# Patient Record
Sex: MALE | Race: WHITE | NOT HISPANIC OR LATINO | Employment: OTHER | URBAN - METROPOLITAN AREA
[De-identification: names, ages, dates, MRNs, and addresses within clinical notes are randomized per-mention and may not be internally consistent; named-entity substitution may affect disease eponyms.]

---

## 2020-12-07 ENCOUNTER — TRANSCRIBE ORDERS (OUTPATIENT)
Dept: PREADMISSION TESTING | Facility: HOSPITAL | Age: 70
End: 2020-12-07

## 2020-12-07 DIAGNOSIS — Z01.818 OTHER SPECIFIED PRE-OPERATIVE EXAMINATION: Primary | ICD-10-CM

## 2020-12-08 ENCOUNTER — APPOINTMENT (OUTPATIENT)
Dept: PREADMISSION TESTING | Facility: HOSPITAL | Age: 70
End: 2020-12-08

## 2020-12-08 ENCOUNTER — HOSPITAL ENCOUNTER (OUTPATIENT)
Dept: GENERAL RADIOLOGY | Facility: HOSPITAL | Age: 70
Discharge: HOME | End: 2020-12-08

## 2020-12-08 VITALS
WEIGHT: 224 LBS | RESPIRATION RATE: 20 BRPM | HEART RATE: 87 BPM | HEIGHT: 72 IN | SYSTOLIC BLOOD PRESSURE: 153 MMHG | TEMPERATURE: 99.1 F | OXYGEN SATURATION: 96 % | DIASTOLIC BLOOD PRESSURE: 74 MMHG | BODY MASS INDEX: 30.34 KG/M2

## 2020-12-08 LAB
ALBUMIN SERPL-MCNC: 3.7 G/DL (ref 3.5–5.2)
ALBUMIN/GLOB SERPL: 1 G/DL
ALP SERPL-CCNC: 72 U/L (ref 39–117)
ALT SERPL W P-5'-P-CCNC: 25 U/L (ref 1–41)
ANION GAP SERPL CALCULATED.3IONS-SCNC: 8 MMOL/L (ref 5–15)
APTT PPP: 32.8 SECONDS (ref 22.7–35.4)
AST SERPL-CCNC: 18 U/L (ref 1–40)
BASOPHILS # BLD AUTO: 0.03 10*3/MM3 (ref 0–0.2)
BASOPHILS NFR BLD AUTO: 0.3 % (ref 0–1.5)
BILIRUB SERPL-MCNC: 0.5 MG/DL (ref 0–1.2)
BILIRUB UR QL STRIP: NEGATIVE
BUN SERPL-MCNC: 16 MG/DL (ref 8–23)
BUN/CREAT SERPL: 28.1 (ref 7–25)
CALCIUM SPEC-SCNC: 10 MG/DL (ref 8.6–10.5)
CHLORIDE SERPL-SCNC: 101 MMOL/L (ref 98–107)
CLARITY UR: CLEAR
CO2 SERPL-SCNC: 25 MMOL/L (ref 22–29)
COLOR UR: YELLOW
CREAT SERPL-MCNC: 0.57 MG/DL (ref 0.76–1.27)
DEPRECATED RDW RBC AUTO: 37.9 FL (ref 37–54)
EOSINOPHIL # BLD AUTO: 0.13 10*3/MM3 (ref 0–0.4)
EOSINOPHIL NFR BLD AUTO: 1.4 % (ref 0.3–6.2)
ERYTHROCYTE [DISTWIDTH] IN BLOOD BY AUTOMATED COUNT: 11.3 % (ref 12.3–15.4)
GFR SERPL CREATININE-BSD FRML MDRD: 141 ML/MIN/1.73
GLOBULIN UR ELPH-MCNC: 3.6 GM/DL
GLUCOSE SERPL-MCNC: 168 MG/DL (ref 65–99)
GLUCOSE UR STRIP-MCNC: NEGATIVE MG/DL
HCT VFR BLD AUTO: 34.2 % (ref 37.5–51)
HGB BLD-MCNC: 11.8 G/DL (ref 13–17.7)
HGB UR QL STRIP.AUTO: NEGATIVE
IMM GRANULOCYTES # BLD AUTO: 0.06 10*3/MM3 (ref 0–0.05)
IMM GRANULOCYTES NFR BLD AUTO: 0.6 % (ref 0–0.5)
INR PPP: 1.25 (ref 0.9–1.1)
KETONES UR QL STRIP: ABNORMAL
LEUKOCYTE ESTERASE UR QL STRIP.AUTO: NEGATIVE
LYMPHOCYTES # BLD AUTO: 1.29 10*3/MM3 (ref 0.7–3.1)
LYMPHOCYTES NFR BLD AUTO: 13.5 % (ref 19.6–45.3)
MCH RBC QN AUTO: 31.8 PG (ref 26.6–33)
MCHC RBC AUTO-ENTMCNC: 34.5 G/DL (ref 31.5–35.7)
MCV RBC AUTO: 92.2 FL (ref 79–97)
MONOCYTES # BLD AUTO: 0.86 10*3/MM3 (ref 0.1–0.9)
MONOCYTES NFR BLD AUTO: 9 % (ref 5–12)
NEUTROPHILS NFR BLD AUTO: 7.2 10*3/MM3 (ref 1.7–7)
NEUTROPHILS NFR BLD AUTO: 75.2 % (ref 42.7–76)
NITRITE UR QL STRIP: NEGATIVE
NRBC BLD AUTO-RTO: 0 /100 WBC (ref 0–0.2)
PH UR STRIP.AUTO: <=5 [PH] (ref 5–8)
PLATELET # BLD AUTO: 323 10*3/MM3 (ref 140–450)
PMV BLD AUTO: 9.8 FL (ref 6–12)
POTASSIUM SERPL-SCNC: 3.9 MMOL/L (ref 3.5–5.2)
PROT SERPL-MCNC: 7.3 G/DL (ref 6–8.5)
PROT UR QL STRIP: NEGATIVE
PROTHROMBIN TIME: 15.5 SECONDS (ref 11.7–14.2)
RBC # BLD AUTO: 3.71 10*6/MM3 (ref 4.14–5.8)
SODIUM SERPL-SCNC: 134 MMOL/L (ref 136–145)
SP GR UR STRIP: 1.03 (ref 1–1.03)
UROBILINOGEN UR QL STRIP: ABNORMAL
WBC # BLD AUTO: 9.57 10*3/MM3 (ref 3.4–10.8)

## 2020-12-08 PROCEDURE — 63710000001 MUPIROCIN 2 % OINTMENT: Performed by: ORTHOPAEDIC SURGERY

## 2020-12-08 PROCEDURE — A9270 NON-COVERED ITEM OR SERVICE: HCPCS | Performed by: ORTHOPAEDIC SURGERY

## 2020-12-08 PROCEDURE — 81003 URINALYSIS AUTO W/O SCOPE: CPT

## 2020-12-08 PROCEDURE — 73560 X-RAY EXAM OF KNEE 1 OR 2: CPT

## 2020-12-08 PROCEDURE — 85025 COMPLETE CBC W/AUTO DIFF WBC: CPT

## 2020-12-08 PROCEDURE — 85610 PROTHROMBIN TIME: CPT

## 2020-12-08 PROCEDURE — 71046 X-RAY EXAM CHEST 2 VIEWS: CPT

## 2020-12-08 PROCEDURE — 80053 COMPREHEN METABOLIC PANEL: CPT

## 2020-12-08 PROCEDURE — 36415 COLL VENOUS BLD VENIPUNCTURE: CPT

## 2020-12-08 PROCEDURE — 87086 URINE CULTURE/COLONY COUNT: CPT

## 2020-12-08 PROCEDURE — 85730 THROMBOPLASTIN TIME PARTIAL: CPT

## 2020-12-08 RX ORDER — ASPIRIN 81 MG/1
81 TABLET ORAL DAILY
COMMUNITY
End: 2020-12-18 | Stop reason: HOSPADM

## 2020-12-08 RX ORDER — MELOXICAM 7.5 MG/1
7.5 TABLET ORAL DAILY
COMMUNITY
End: 2020-12-24 | Stop reason: HOSPADM

## 2020-12-08 RX ORDER — RAMIPRIL 10 MG/1
10 CAPSULE ORAL DAILY
COMMUNITY

## 2020-12-08 RX ORDER — TERAZOSIN 5 MG/1
5 CAPSULE ORAL NIGHTLY
COMMUNITY

## 2020-12-08 RX ORDER — CHLORHEXIDINE GLUCONATE 500 MG/1
1 CLOTH TOPICAL TAKE AS DIRECTED
Status: ON HOLD | COMMUNITY
End: 2020-12-14

## 2020-12-08 RX ORDER — ATORVASTATIN CALCIUM 40 MG/1
40 TABLET, FILM COATED ORAL NIGHTLY
COMMUNITY

## 2020-12-08 RX ORDER — HYDROCHLOROTHIAZIDE 25 MG/1
25 TABLET ORAL AS NEEDED
COMMUNITY
End: 2020-12-24 | Stop reason: HOSPADM

## 2020-12-08 RX ORDER — GLIMEPIRIDE 2 MG/1
2 TABLET ORAL
COMMUNITY

## 2020-12-08 RX ORDER — ACETAMINOPHEN 500 MG
1000 TABLET ORAL EVERY 6 HOURS PRN
COMMUNITY

## 2020-12-08 RX ORDER — METOPROLOL TARTRATE 100 MG/1
100 TABLET ORAL 2 TIMES DAILY
COMMUNITY

## 2020-12-08 ASSESSMENT — KOOS JR
KOOS JR SCORE: 22
KOOS JR SCORE: 31.307

## 2020-12-08 NOTE — DISCHARGE INSTRUCTIONS
Take the following medications the morning of surgery:    METOPROLOL    If you are on prescription narcotic pain medication to control your pain you may also take that medication the morning of surgery.    General Instructions:  • Do not eat solid food after midnight the night before surgery.  • You may drink clear liquids day of surgery but must stop at least one hour before your hospital arrival time.  • It is beneficial for you to have a clear drink that contains carbohydrates the day of surgery.  We suggest  a 12 to 20 ounce bottle of G2 or Powerade Zero for diabetic patients.     Clear liquids are liquids you can see through.  Nothing red in color.     Plain water                               Sports drinks  Sodas                                   Gelatin (Jell-O)  Fruit juices without pulp such as white grape juice and apple juice  Popsicles that contain no fruit or yogurt  Tea or coffee (no cream or milk added)  Gatorade / Powerade  G2 / Powerade Zero      • Patients who avoid smoking, chewing tobacco and alcohol for 4 weeks prior to surgery have a reduced risk of post-operative complications.     • Do not smoke, use chewing tobacco or drink alcohol the day of surgery.   • Bring any papers given to you in the doctor’s office.  • Wear clean comfortable clothes.  • Do not wear contact lenses, false eyelashes or make-up.  Bring a case for your glasses.   • Remove all piercings.  Leave jewelry and any other valuables at home.  • The Pre-Admission Testing nurse will instruct you to bring medications if unable to obtain an accurate list in Pre-Admission Testing.    • REPORT TO MAIN SURGERY ON 12- AT 0600 AM          Preventing a Surgical Site Infection:  • For 2 to 3 days before surgery, avoid shaving with a razor because the razor can irritate skin and make it easier to develop an infection.    • Any areas of open skin can increase the risk of a post-operative wound infection by allowing bacteria to enter  and travel throughout the body.  Notify your surgeon if you have any skin wounds / rashes even if it is not near the expected surgical site.  The area will need assessed to determine if surgery should be delayed until it is healed.  • The night prior to surgery shower using a fresh bar of anti-bacterial soap (such as Dial) and clean washcloth.  Sleep in a clean bed with clean clothing.  Do not allow pets to sleep with you.  • Shower on the morning of surgery using a fresh bar of anti-bacterial soap (such as Dial) and clean washcloth.  Dry with a clean towel and dress in clean clothing.  • Ask your surgeon if you will be receiving antibiotics prior to surgery.  • Make sure you, your family, and all healthcare providers clean their hands with soap and water or an alcohol based hand  before caring for you or your wound.    Day of surgery:  Your arrival time is approximately two hours before your scheduled surgery time.  Upon arrival, a Pre-op nurse and Anesthesiologist will review your health history, obtain vital signs, and answer questions you may have.  The only belongings needed at this time will be a list of your home medications and if applicable your C-PAP/BI-PAP machine.  A Pre-op nurse will start an IV and you may receive medication in preparation for surgery, including something to help you relax.     Please be aware that surgery does come with discomfort.  We want to make every effort to control your discomfort so please discuss any uncontrolled symptoms with your nurse.   Your doctor will most likely have prescribed pain medications.      CHLORHEXIDINE CLOTH INSTRUCTIONS  The morning of surgery follow these instructions using the Chlorhexidine cloths you've been given.  These steps reduce bacteria on the body.  Do not use the cloths near your eyes, ears mouth, genitalia or on open wounds.  Throw the cloths away after use but do not try to flush them down a toilet.      • Open and remove one cloth  at a time from the package.    • Leave the cloth unfolded and begin the bathing.  • Massage the skin with the cloths using gentle pressure to remove bacteria.  Do not scrub harshly.   • Follow the steps below with one 2% CHG cloth per area (6 total cloths).  • One cloth for neck, shoulders and chest.  • One cloth for both arms, hands, fingers and underarms (do underarms last).  • One cloth for the abdomen followed by groin.  • One cloth for right leg and foot including between the toes.  • One cloth for left leg and foot including between the toes.  • The last cloth is to be used for the back of the neck, back and buttocks.    Allow the CHG to air dry 3 minutes on the skin which will give it time to work and decrease the chance of irritation.  The skin may feel sticky until it is dry.  Do not rinse with water or any other liquid or you will lose the beneficial effects of the CHG.  If mild skin irritation occurs, do rinse the skin to remove the CHG.  Report this to the nurse at time of admission.  Do not apply lotions, creams, ointments, deodorants or perfumes after using the clothes. Dress in clean clothes before coming to the hospital.    BACTROBAN NASAL OINTMENT   X 3  There are many germs normally in your nose. Bactroban is an ointment that will help reduce these germs. Please follow these instructions for Bactroban use:      __1__The day before surgery in the morning  [id number]_______    _2___The day before surgery in the evening              [id number]_____    __3__The day of surgery in the morning    [id number]_______    **Squirt ½ package of Bactroban Ointment onto a cotton applicator and apply to inside of 1st nostril.  Squirt the remaining Bactroban and apply to the inside of the other nostril.      If you are staying overnight following surgery, you will be transported to your hospital room following the recovery period.  [facility] has all private rooms.    If you have any  questions please call Pre-Admission Testing at [phone].  Deductibles and co-payments are collected on the day of service. Please be prepared to pay the required co-pay, deductible or deposit on the day of service as defined by your plan.    Patient Education for Self-Quarantine Process    Following your COVID testing, we strongly recommend that you do not leave your home after you have been tested for COVID except to get medical care. This includes not going to work, school or to public areas.  If this is not possible for you to do please limit your activities to only required outings.  Be sure to wear a mask when you are with other people, practice social distancing and wash your hands frequently.      The following items provide additional details to keep you safe.  • Wash your hands with soap and water frequently for at least 20 seconds.   • Avoid touching your eyes, nose and mouth with unwashed hands.  • Do not share anything - utensils, towels, food from the same bowl.   • Have your own utensils, drinking glass, dishes, towels and bedding.   • Do not have visitors.   • Do use FaceTime to stay in touch with family and friends.  • You should stay in a specific room away from others if possible.   • Stay at least 6 feet away from others in the home if you cannot have a dedicated room to yourself.   • Do not snuggle with your pet. While the CDC says there is no evidence that pets can spread COVID-19 or be infected from humans, it is probably best to avoid “petting, snuggling, being kissed or licked and sharing food (during self-quarantine)”, according to the CDC.   • Sanitize household surfaces daily. Include all high touch areas (door handles, light switches, phones, countertops, etc.)  • Do not share a bathroom with others, if possible.   • Wear a mask around others in your home if you are unable to stay in a separate room or 6 feet apart. If  you are unable to wear a mask, have your family member wear a  mask if they must be within 6 feet of you.   Call your surgeon immediately if you experience any of the following symptoms:  • Sore Throat  • Shortness of Breath or difficulty breathing  • Cough  • Chills  • Body soreness or muscle pain  • Headache  • Fever  • New loss of taste or smell  • Do not arrive for your surgery ill.  Your procedure will need to be rescheduled to another time.  You will need to call your physician before the day of surgery to avoid any unnecessary exposure to hospital staff as well as other patients.

## 2020-12-09 LAB — BACTERIA SPEC AEROBE CULT: NO GROWTH

## 2020-12-11 ENCOUNTER — LAB (OUTPATIENT)
Dept: LAB | Facility: HOSPITAL | Age: 70
End: 2020-12-11

## 2020-12-11 DIAGNOSIS — Z01.818 OTHER SPECIFIED PRE-OPERATIVE EXAMINATION: ICD-10-CM

## 2020-12-11 PROCEDURE — U0004 COV-19 TEST NON-CDC HGH THRU: HCPCS

## 2020-12-11 PROCEDURE — C9803 HOPD COVID-19 SPEC COLLECT: HCPCS

## 2020-12-12 LAB — SARS-COV-2 RNA RESP QL NAA+PROBE: NOT DETECTED

## 2020-12-14 ENCOUNTER — ANESTHESIA EVENT (OUTPATIENT)
Dept: PERIOP | Facility: HOSPITAL | Age: 70
End: 2020-12-14

## 2020-12-14 ENCOUNTER — APPOINTMENT (OUTPATIENT)
Dept: GENERAL RADIOLOGY | Facility: HOSPITAL | Age: 70
End: 2020-12-14

## 2020-12-14 ENCOUNTER — ANESTHESIA (OUTPATIENT)
Dept: PERIOP | Facility: HOSPITAL | Age: 70
End: 2020-12-14

## 2020-12-14 ENCOUNTER — HOSPITAL ENCOUNTER (OUTPATIENT)
Facility: HOSPITAL | Age: 70
Discharge: HOME | End: 2020-12-18
Attending: ORTHOPAEDIC SURGERY | Admitting: ORTHOPAEDIC SURGERY

## 2020-12-14 DIAGNOSIS — R91.1 PULMONARY NODULE: Primary | ICD-10-CM

## 2020-12-14 DIAGNOSIS — M17.11 OSTEOARTHRITIS OF RIGHT KNEE, UNSPECIFIED OSTEOARTHRITIS TYPE: ICD-10-CM

## 2020-12-14 PROBLEM — M17.9 DJD (DEGENERATIVE JOINT DISEASE) OF KNEE: Status: ACTIVE | Noted: 2020-12-14

## 2020-12-14 LAB
GLUCOSE BLDC GLUCOMTR-MCNC: 175 MG/DL (ref 70–130)
GLUCOSE BLDC GLUCOMTR-MCNC: 196 MG/DL (ref 70–130)
GLUCOSE BLDC GLUCOMTR-MCNC: 218 MG/DL (ref 70–130)
GLUCOSE BLDC GLUCOMTR-MCNC: 218 MG/DL (ref 70–130)
GLUCOSE BLDC GLUCOMTR-MCNC: 230 MG/DL (ref 70–130)

## 2020-12-14 PROCEDURE — A9270 NON-COVERED ITEM OR SERVICE: HCPCS | Performed by: PHYSICIAN ASSISTANT

## 2020-12-14 PROCEDURE — 25010000002 VANCOMYCIN 10 G RECONSTITUTED SOLUTION: Performed by: ORTHOPAEDIC SURGERY

## 2020-12-14 PROCEDURE — 63710000001 ASPIRIN EC 325 MG TABLET DELAYED-RELEASE: Performed by: ORTHOPAEDIC SURGERY

## 2020-12-14 PROCEDURE — 97161 PT EVAL LOW COMPLEX 20 MIN: CPT

## 2020-12-14 PROCEDURE — 25010000002 KETOROLAC TROMETHAMINE PER 15 MG: Performed by: ORTHOPAEDIC SURGERY

## 2020-12-14 PROCEDURE — A9270 NON-COVERED ITEM OR SERVICE: HCPCS | Performed by: ORTHOPAEDIC SURGERY

## 2020-12-14 PROCEDURE — 63710000001 RAMIPRIL 10 MG CAPSULE: Performed by: PHYSICIAN ASSISTANT

## 2020-12-14 PROCEDURE — 82962 GLUCOSE BLOOD TEST: CPT

## 2020-12-14 PROCEDURE — 63710000001 ACETAMINOPHEN 500 MG TABLET: Performed by: ORTHOPAEDIC SURGERY

## 2020-12-14 PROCEDURE — 25010000003 BUPIVACAINE LIPOSOME 1.3 % SUSPENSION 20 ML VIAL: Performed by: ORTHOPAEDIC SURGERY

## 2020-12-14 PROCEDURE — C9290 INJ, BUPIVACAINE LIPOSOME: HCPCS | Performed by: ORTHOPAEDIC SURGERY

## 2020-12-14 PROCEDURE — C1776 JOINT DEVICE (IMPLANTABLE): HCPCS | Performed by: ORTHOPAEDIC SURGERY

## 2020-12-14 PROCEDURE — C1713 ANCHOR/SCREW BN/BN,TIS/BN: HCPCS | Performed by: ORTHOPAEDIC SURGERY

## 2020-12-14 PROCEDURE — 73560 X-RAY EXAM OF KNEE 1 OR 2: CPT

## 2020-12-14 PROCEDURE — 25010000002 PROPOFOL 10 MG/ML EMULSION: Performed by: NURSE ANESTHETIST, CERTIFIED REGISTERED

## 2020-12-14 PROCEDURE — 63710000001 GLIPIZIDE 5 MG TABLET: Performed by: PHYSICIAN ASSISTANT

## 2020-12-14 PROCEDURE — 25010000002 SUCCINYLCHOLINE PER 20 MG: Performed by: NURSE ANESTHETIST, CERTIFIED REGISTERED

## 2020-12-14 PROCEDURE — 25010000002 NEOSTIGMINE PER 0.5 MG: Performed by: NURSE ANESTHETIST, CERTIFIED REGISTERED

## 2020-12-14 PROCEDURE — 63710000001 POVIDONE-IODINE 10 % SOLUTION 30 ML BOTTLE: Performed by: ORTHOPAEDIC SURGERY

## 2020-12-14 PROCEDURE — 25010000002 HYDROMORPHONE PER 4 MG: Performed by: NURSE ANESTHETIST, CERTIFIED REGISTERED

## 2020-12-14 PROCEDURE — 63710000001 METOPROLOL TARTRATE 50 MG TABLET: Performed by: PHYSICIAN ASSISTANT

## 2020-12-14 PROCEDURE — 63710000001 METFORMIN 500 MG TABLET: Performed by: PHYSICIAN ASSISTANT

## 2020-12-14 PROCEDURE — 25010000003 CEFAZOLIN IN DEXTROSE 2-4 GM/100ML-% SOLUTION: Performed by: ORTHOPAEDIC SURGERY

## 2020-12-14 PROCEDURE — 97110 THERAPEUTIC EXERCISES: CPT

## 2020-12-14 PROCEDURE — 63710000001 ATORVASTATIN 20 MG TABLET: Performed by: PHYSICIAN ASSISTANT

## 2020-12-14 PROCEDURE — 25010000003 CEFAZOLIN IN DEXTROSE 2-4 GM/100ML-% SOLUTION: Performed by: NURSE ANESTHETIST, CERTIFIED REGISTERED

## 2020-12-14 PROCEDURE — 63710000001 FERROUS SULFATE 325 (65 FE) MG TABLET: Performed by: ORTHOPAEDIC SURGERY

## 2020-12-14 PROCEDURE — 25010000002 FENTANYL CITRATE (PF) 100 MCG/2ML SOLUTION: Performed by: NURSE ANESTHETIST, CERTIFIED REGISTERED

## 2020-12-14 PROCEDURE — 63710000001 TERAZOSIN 5 MG CAPSULE: Performed by: PHYSICIAN ASSISTANT

## 2020-12-14 PROCEDURE — 63710000001 OXYCODONE-ACETAMINOPHEN 5-325 MG TABLET: Performed by: ORTHOPAEDIC SURGERY

## 2020-12-14 PROCEDURE — 25010000002 ONDANSETRON PER 1 MG: Performed by: NURSE ANESTHETIST, CERTIFIED REGISTERED

## 2020-12-14 DEVICE — JOURNEY TIBIAL BASEPLATE NONPOROUS                                    RT SZ 6
Type: IMPLANTABLE DEVICE | Status: FUNCTIONAL
Brand: JOURNEY

## 2020-12-14 DEVICE — IMPLANTABLE DEVICE: Type: IMPLANTABLE DEVICE | Status: FUNCTIONAL

## 2020-12-14 DEVICE — JOURNEY 7.5 ROUND RESURF PAT 35MM STANDARD
Type: IMPLANTABLE DEVICE | Status: FUNCTIONAL
Brand: JOURNEY

## 2020-12-14 DEVICE — JOURNEY II BCS FEMORAL OXINIUM                                    RIGHT SIZE 7
Type: IMPLANTABLE DEVICE | Status: FUNCTIONAL
Brand: JOURNEY

## 2020-12-14 DEVICE — JOURNEY II BCS XLPE ARTICULAR                                    INSERT SIZE 5-6 RIGHT 9MM
Type: IMPLANTABLE DEVICE | Status: FUNCTIONAL
Brand: JOURNEY

## 2020-12-14 DEVICE — PALACOS® R IS A FAST-CURING, RADIOPAQUE, POLY(METHYL METHACRYLATE)-BASED BONE CEMENT.PALACOS ® R CONTAINS THE X-RAY CONTRAST MEDIUM ZIRCONIUM DIOXIDE. TO IMPROVE VISIBILITY IN THE SURGICAL FIELD PALACOS ® R HAS BEEN COLOURED WITH CHLOROPHYLL (E141). THE BONE CEMENT IS PREPARED DIRECTLY BEFORE USE BY MIXING A POLYMER POWDER COMPONENT WITH A LIQUID MONOMER COMPONENT. A DUCTILE DOUGH FORMS WHICH CURES WITHIN A FEW MINUTES.
Type: IMPLANTABLE DEVICE | Status: FUNCTIONAL
Brand: PALACOS®

## 2020-12-14 RX ORDER — ROCURONIUM BROMIDE 10 MG/ML
INJECTION, SOLUTION INTRAVENOUS AS NEEDED
Status: DISCONTINUED | OUTPATIENT
Start: 2020-12-14 | End: 2020-12-14 | Stop reason: SURG

## 2020-12-14 RX ORDER — PROPOFOL 10 MG/ML
VIAL (ML) INTRAVENOUS AS NEEDED
Status: DISCONTINUED | OUTPATIENT
Start: 2020-12-14 | End: 2020-12-14 | Stop reason: SURG

## 2020-12-14 RX ORDER — HYDROMORPHONE HYDROCHLORIDE 1 MG/ML
0.5 INJECTION, SOLUTION INTRAMUSCULAR; INTRAVENOUS; SUBCUTANEOUS
Status: DISCONTINUED | OUTPATIENT
Start: 2020-12-14 | End: 2020-12-14 | Stop reason: HOSPADM

## 2020-12-14 RX ORDER — ACETAMINOPHEN 650 MG/1
650 SUPPOSITORY RECTAL EVERY 4 HOURS PRN
Status: DISCONTINUED | OUTPATIENT
Start: 2020-12-14 | End: 2020-12-18 | Stop reason: HOSPADM

## 2020-12-14 RX ORDER — FENTANYL CITRATE 50 UG/ML
INJECTION, SOLUTION INTRAMUSCULAR; INTRAVENOUS AS NEEDED
Status: DISCONTINUED | OUTPATIENT
Start: 2020-12-14 | End: 2020-12-14 | Stop reason: SURG

## 2020-12-14 RX ORDER — MAGNESIUM HYDROXIDE 1200 MG/15ML
LIQUID ORAL AS NEEDED
Status: DISCONTINUED | OUTPATIENT
Start: 2020-12-14 | End: 2020-12-14 | Stop reason: HOSPADM

## 2020-12-14 RX ORDER — RAMIPRIL 10 MG/1
10 CAPSULE ORAL DAILY
Status: DISCONTINUED | OUTPATIENT
Start: 2020-12-14 | End: 2020-12-18 | Stop reason: HOSPADM

## 2020-12-14 RX ORDER — GLIPIZIDE 5 MG/1
5 TABLET ORAL
Status: DISCONTINUED | OUTPATIENT
Start: 2020-12-14 | End: 2020-12-18 | Stop reason: HOSPADM

## 2020-12-14 RX ORDER — FENTANYL CITRATE 50 UG/ML
50 INJECTION, SOLUTION INTRAMUSCULAR; INTRAVENOUS
Status: DISCONTINUED | OUTPATIENT
Start: 2020-12-14 | End: 2020-12-14 | Stop reason: HOSPADM

## 2020-12-14 RX ORDER — ONDANSETRON 2 MG/ML
4 INJECTION INTRAMUSCULAR; INTRAVENOUS ONCE AS NEEDED
Status: DISCONTINUED | OUTPATIENT
Start: 2020-12-14 | End: 2020-12-14 | Stop reason: HOSPADM

## 2020-12-14 RX ORDER — ONDANSETRON 2 MG/ML
4 INJECTION INTRAMUSCULAR; INTRAVENOUS EVERY 6 HOURS PRN
Status: DISCONTINUED | OUTPATIENT
Start: 2020-12-14 | End: 2020-12-18 | Stop reason: HOSPADM

## 2020-12-14 RX ORDER — CEFAZOLIN SODIUM 2 G/100ML
2 INJECTION, SOLUTION INTRAVENOUS EVERY 8 HOURS
Status: COMPLETED | OUTPATIENT
Start: 2020-12-14 | End: 2020-12-15

## 2020-12-14 RX ORDER — ACETAMINOPHEN 325 MG/1
650 TABLET ORAL EVERY 4 HOURS PRN
Status: DISCONTINUED | OUTPATIENT
Start: 2020-12-14 | End: 2020-12-18 | Stop reason: HOSPADM

## 2020-12-14 RX ORDER — KETOROLAC TROMETHAMINE 15 MG/ML
15 INJECTION, SOLUTION INTRAMUSCULAR; INTRAVENOUS EVERY 6 HOURS
Status: COMPLETED | OUTPATIENT
Start: 2020-12-14 | End: 2020-12-15

## 2020-12-14 RX ORDER — MIDAZOLAM HYDROCHLORIDE 1 MG/ML
1 INJECTION INTRAMUSCULAR; INTRAVENOUS
Status: DISCONTINUED | OUTPATIENT
Start: 2020-12-14 | End: 2020-12-14 | Stop reason: HOSPADM

## 2020-12-14 RX ORDER — OXYCODONE HYDROCHLORIDE AND ACETAMINOPHEN 5; 325 MG/1; MG/1
1 TABLET ORAL EVERY 4 HOURS PRN
Status: DISCONTINUED | OUTPATIENT
Start: 2020-12-14 | End: 2020-12-18 | Stop reason: HOSPADM

## 2020-12-14 RX ORDER — NALOXONE HCL 0.4 MG/ML
0.2 VIAL (ML) INJECTION AS NEEDED
Status: DISCONTINUED | OUTPATIENT
Start: 2020-12-14 | End: 2020-12-14 | Stop reason: HOSPADM

## 2020-12-14 RX ORDER — LABETALOL HYDROCHLORIDE 5 MG/ML
5 INJECTION, SOLUTION INTRAVENOUS
Status: DISCONTINUED | OUTPATIENT
Start: 2020-12-14 | End: 2020-12-14 | Stop reason: HOSPADM

## 2020-12-14 RX ORDER — FLUMAZENIL 0.1 MG/ML
0.2 INJECTION INTRAVENOUS AS NEEDED
Status: DISCONTINUED | OUTPATIENT
Start: 2020-12-14 | End: 2020-12-14 | Stop reason: HOSPADM

## 2020-12-14 RX ORDER — TERAZOSIN 5 MG/1
5 CAPSULE ORAL NIGHTLY
Status: DISCONTINUED | OUTPATIENT
Start: 2020-12-14 | End: 2020-12-18 | Stop reason: HOSPADM

## 2020-12-14 RX ORDER — OXYCODONE AND ACETAMINOPHEN 7.5; 325 MG/1; MG/1
1 TABLET ORAL ONCE AS NEEDED
Status: DISCONTINUED | OUTPATIENT
Start: 2020-12-14 | End: 2020-12-14 | Stop reason: HOSPADM

## 2020-12-14 RX ORDER — PROMETHAZINE HYDROCHLORIDE 25 MG/1
25 SUPPOSITORY RECTAL ONCE AS NEEDED
Status: DISCONTINUED | OUTPATIENT
Start: 2020-12-14 | End: 2020-12-14 | Stop reason: HOSPADM

## 2020-12-14 RX ORDER — ACETAMINOPHEN 500 MG
1000 TABLET ORAL ONCE
Status: COMPLETED | OUTPATIENT
Start: 2020-12-14 | End: 2020-12-14

## 2020-12-14 RX ORDER — HYDROMORPHONE HCL 110MG/55ML
PATIENT CONTROLLED ANALGESIA SYRINGE INTRAVENOUS AS NEEDED
Status: DISCONTINUED | OUTPATIENT
Start: 2020-12-14 | End: 2020-12-14 | Stop reason: SURG

## 2020-12-14 RX ORDER — ATORVASTATIN CALCIUM 20 MG/1
40 TABLET, FILM COATED ORAL NIGHTLY
Status: DISCONTINUED | OUTPATIENT
Start: 2020-12-14 | End: 2020-12-18 | Stop reason: HOSPADM

## 2020-12-14 RX ORDER — FERROUS SULFATE 325(65) MG
325 TABLET ORAL
Status: DISCONTINUED | OUTPATIENT
Start: 2020-12-14 | End: 2020-12-18 | Stop reason: HOSPADM

## 2020-12-14 RX ORDER — SODIUM CHLORIDE 0.9 % (FLUSH) 0.9 %
1-10 SYRINGE (ML) INJECTION AS NEEDED
Status: DISCONTINUED | OUTPATIENT
Start: 2020-12-14 | End: 2020-12-18 | Stop reason: HOSPADM

## 2020-12-14 RX ORDER — SUCCINYLCHOLINE CHLORIDE 20 MG/ML
INJECTION INTRAMUSCULAR; INTRAVENOUS AS NEEDED
Status: DISCONTINUED | OUTPATIENT
Start: 2020-12-14 | End: 2020-12-14 | Stop reason: SURG

## 2020-12-14 RX ORDER — EPHEDRINE SULFATE 50 MG/ML
5 INJECTION, SOLUTION INTRAVENOUS ONCE AS NEEDED
Status: DISCONTINUED | OUTPATIENT
Start: 2020-12-14 | End: 2020-12-14 | Stop reason: HOSPADM

## 2020-12-14 RX ORDER — ASPIRIN 325 MG
325 TABLET, DELAYED RELEASE (ENTERIC COATED) ORAL DAILY
Status: DISCONTINUED | OUTPATIENT
Start: 2020-12-14 | End: 2020-12-18 | Stop reason: HOSPADM

## 2020-12-14 RX ORDER — HYDRALAZINE HYDROCHLORIDE 20 MG/ML
5 INJECTION INTRAMUSCULAR; INTRAVENOUS
Status: DISCONTINUED | OUTPATIENT
Start: 2020-12-14 | End: 2020-12-14 | Stop reason: HOSPADM

## 2020-12-14 RX ORDER — CEFAZOLIN SODIUM 2 G/100ML
INJECTION, SOLUTION INTRAVENOUS AS NEEDED
Status: DISCONTINUED | OUTPATIENT
Start: 2020-12-14 | End: 2020-12-14 | Stop reason: SURG

## 2020-12-14 RX ORDER — GLYCOPYRROLATE 0.2 MG/ML
INJECTION INTRAMUSCULAR; INTRAVENOUS AS NEEDED
Status: DISCONTINUED | OUTPATIENT
Start: 2020-12-14 | End: 2020-12-14 | Stop reason: SURG

## 2020-12-14 RX ORDER — SODIUM CHLORIDE, SODIUM LACTATE, POTASSIUM CHLORIDE, CALCIUM CHLORIDE 600; 310; 30; 20 MG/100ML; MG/100ML; MG/100ML; MG/100ML
100 INJECTION, SOLUTION INTRAVENOUS CONTINUOUS
Status: DISCONTINUED | OUTPATIENT
Start: 2020-12-14 | End: 2020-12-18 | Stop reason: HOSPADM

## 2020-12-14 RX ORDER — ACETAMINOPHEN 325 MG/1
325 TABLET ORAL EVERY 4 HOURS PRN
Status: DISCONTINUED | OUTPATIENT
Start: 2020-12-14 | End: 2020-12-18 | Stop reason: HOSPADM

## 2020-12-14 RX ORDER — TRANEXAMIC ACID 100 MG/ML
INJECTION, SOLUTION INTRAVENOUS AS NEEDED
Status: DISCONTINUED | OUTPATIENT
Start: 2020-12-14 | End: 2020-12-14 | Stop reason: SURG

## 2020-12-14 RX ORDER — DIPHENHYDRAMINE HCL 25 MG
25 CAPSULE ORAL
Status: DISCONTINUED | OUTPATIENT
Start: 2020-12-14 | End: 2020-12-14 | Stop reason: HOSPADM

## 2020-12-14 RX ORDER — HYDROCODONE BITARTRATE AND ACETAMINOPHEN 7.5; 325 MG/1; MG/1
1 TABLET ORAL ONCE AS NEEDED
Status: DISCONTINUED | OUTPATIENT
Start: 2020-12-14 | End: 2020-12-14 | Stop reason: HOSPADM

## 2020-12-14 RX ORDER — FAMOTIDINE 10 MG/ML
20 INJECTION, SOLUTION INTRAVENOUS ONCE
Status: COMPLETED | OUTPATIENT
Start: 2020-12-14 | End: 2020-12-14

## 2020-12-14 RX ORDER — LIDOCAINE HYDROCHLORIDE 20 MG/ML
INJECTION, SOLUTION INFILTRATION; PERINEURAL AS NEEDED
Status: DISCONTINUED | OUTPATIENT
Start: 2020-12-14 | End: 2020-12-14 | Stop reason: SURG

## 2020-12-14 RX ORDER — SODIUM CHLORIDE 0.9 % (FLUSH) 0.9 %
3 SYRINGE (ML) INJECTION EVERY 12 HOURS SCHEDULED
Status: DISCONTINUED | OUTPATIENT
Start: 2020-12-14 | End: 2020-12-18 | Stop reason: HOSPADM

## 2020-12-14 RX ORDER — ONDANSETRON 2 MG/ML
INJECTION INTRAMUSCULAR; INTRAVENOUS AS NEEDED
Status: DISCONTINUED | OUTPATIENT
Start: 2020-12-14 | End: 2020-12-14 | Stop reason: SURG

## 2020-12-14 RX ORDER — DIPHENHYDRAMINE HYDROCHLORIDE 50 MG/ML
12.5 INJECTION INTRAMUSCULAR; INTRAVENOUS
Status: DISCONTINUED | OUTPATIENT
Start: 2020-12-14 | End: 2020-12-14 | Stop reason: HOSPADM

## 2020-12-14 RX ORDER — OXYCODONE HYDROCHLORIDE AND ACETAMINOPHEN 5; 325 MG/1; MG/1
2 TABLET ORAL EVERY 4 HOURS PRN
Status: DISCONTINUED | OUTPATIENT
Start: 2020-12-14 | End: 2020-12-18 | Stop reason: HOSPADM

## 2020-12-14 RX ORDER — LIDOCAINE HYDROCHLORIDE 10 MG/ML
0.5 INJECTION, SOLUTION EPIDURAL; INFILTRATION; INTRACAUDAL; PERINEURAL ONCE AS NEEDED
Status: DISCONTINUED | OUTPATIENT
Start: 2020-12-14 | End: 2020-12-14 | Stop reason: HOSPADM

## 2020-12-14 RX ORDER — SODIUM CHLORIDE 0.9 % (FLUSH) 0.9 %
3 SYRINGE (ML) INJECTION EVERY 12 HOURS SCHEDULED
Status: DISCONTINUED | OUTPATIENT
Start: 2020-12-14 | End: 2020-12-14 | Stop reason: HOSPADM

## 2020-12-14 RX ORDER — NALOXONE HCL 0.4 MG/ML
0.1 VIAL (ML) INJECTION
Status: DISCONTINUED | OUTPATIENT
Start: 2020-12-14 | End: 2020-12-18 | Stop reason: HOSPADM

## 2020-12-14 RX ORDER — SODIUM CHLORIDE 0.9 % (FLUSH) 0.9 %
3-10 SYRINGE (ML) INJECTION AS NEEDED
Status: DISCONTINUED | OUTPATIENT
Start: 2020-12-14 | End: 2020-12-14 | Stop reason: HOSPADM

## 2020-12-14 RX ORDER — ONDANSETRON 4 MG/1
4 TABLET, FILM COATED ORAL EVERY 6 HOURS PRN
Status: DISCONTINUED | OUTPATIENT
Start: 2020-12-14 | End: 2020-12-18 | Stop reason: HOSPADM

## 2020-12-14 RX ORDER — PROMETHAZINE HYDROCHLORIDE 25 MG/1
25 TABLET ORAL ONCE AS NEEDED
Status: DISCONTINUED | OUTPATIENT
Start: 2020-12-14 | End: 2020-12-14 | Stop reason: HOSPADM

## 2020-12-14 RX ORDER — METOPROLOL TARTRATE 50 MG/1
100 TABLET, FILM COATED ORAL EVERY 12 HOURS SCHEDULED
Status: DISCONTINUED | OUTPATIENT
Start: 2020-12-14 | End: 2020-12-18 | Stop reason: HOSPADM

## 2020-12-14 RX ORDER — SODIUM CHLORIDE, SODIUM LACTATE, POTASSIUM CHLORIDE, CALCIUM CHLORIDE 600; 310; 30; 20 MG/100ML; MG/100ML; MG/100ML; MG/100ML
9 INJECTION, SOLUTION INTRAVENOUS CONTINUOUS
Status: DISCONTINUED | OUTPATIENT
Start: 2020-12-14 | End: 2020-12-18 | Stop reason: HOSPADM

## 2020-12-14 RX ORDER — DOCUSATE SODIUM 100 MG/1
100 CAPSULE, LIQUID FILLED ORAL 2 TIMES DAILY PRN
Status: DISCONTINUED | OUTPATIENT
Start: 2020-12-14 | End: 2020-12-18 | Stop reason: HOSPADM

## 2020-12-14 RX ADMIN — ATORVASTATIN CALCIUM 40 MG: 20 TABLET, FILM COATED ORAL at 21:09

## 2020-12-14 RX ADMIN — SODIUM CHLORIDE, PRESERVATIVE FREE 3 ML: 5 INJECTION INTRAVENOUS at 13:49

## 2020-12-14 RX ADMIN — FENTANYL CITRATE 50 MCG: 50 INJECTION INTRAMUSCULAR; INTRAVENOUS at 09:47

## 2020-12-14 RX ADMIN — ONDANSETRON HYDROCHLORIDE 4 MG: 2 SOLUTION INTRAMUSCULAR; INTRAVENOUS at 09:29

## 2020-12-14 RX ADMIN — FENTANYL CITRATE 50 MCG: 50 INJECTION INTRAMUSCULAR; INTRAVENOUS at 08:51

## 2020-12-14 RX ADMIN — SODIUM CHLORIDE, POTASSIUM CHLORIDE, SODIUM LACTATE AND CALCIUM CHLORIDE 100 ML/HR: 600; 310; 30; 20 INJECTION, SOLUTION INTRAVENOUS at 13:50

## 2020-12-14 RX ADMIN — KETOROLAC TROMETHAMINE 15 MG: 15 INJECTION, SOLUTION INTRAMUSCULAR; INTRAVENOUS at 10:40

## 2020-12-14 RX ADMIN — ROCURONIUM BROMIDE 35 MG: 10 INJECTION INTRAVENOUS at 08:30

## 2020-12-14 RX ADMIN — FERROUS SULFATE TAB 325 MG (65 MG ELEMENTAL FE) 325 MG: 325 (65 FE) TAB at 13:44

## 2020-12-14 RX ADMIN — FAMOTIDINE 20 MG: 10 INJECTION INTRAVENOUS at 07:27

## 2020-12-14 RX ADMIN — TRANEXAMIC ACID 1000 MG: 100 INJECTION, SOLUTION INTRAVENOUS at 09:28

## 2020-12-14 RX ADMIN — NEOSTIGMINE METHYLSULFATE 2.5 MG: 1 INJECTION INTRAMUSCULAR; INTRAVENOUS; SUBCUTANEOUS at 09:50

## 2020-12-14 RX ADMIN — GLIPIZIDE 5 MG: 5 TABLET ORAL at 13:44

## 2020-12-14 RX ADMIN — OXYCODONE HYDROCHLORIDE AND ACETAMINOPHEN 2 TABLET: 5; 325 TABLET ORAL at 15:04

## 2020-12-14 RX ADMIN — ACETAMINOPHEN 1000 MG: 500 TABLET, FILM COATED ORAL at 07:07

## 2020-12-14 RX ADMIN — VANCOMYCIN HYDROCHLORIDE 1500 MG: 10 INJECTION, POWDER, LYOPHILIZED, FOR SOLUTION INTRAVENOUS at 07:03

## 2020-12-14 RX ADMIN — CEFAZOLIN SODIUM 2 G: 2 INJECTION, SOLUTION INTRAVENOUS at 18:25

## 2020-12-14 RX ADMIN — LIDOCAINE HYDROCHLORIDE 100 MG: 20 INJECTION, SOLUTION INFILTRATION; PERINEURAL at 08:16

## 2020-12-14 RX ADMIN — FENTANYL CITRATE 100 MCG: 50 INJECTION INTRAMUSCULAR; INTRAVENOUS at 08:16

## 2020-12-14 RX ADMIN — SODIUM CHLORIDE, POTASSIUM CHLORIDE, SODIUM LACTATE AND CALCIUM CHLORIDE: 600; 310; 30; 20 INJECTION, SOLUTION INTRAVENOUS at 09:50

## 2020-12-14 RX ADMIN — SUCCINYLCHOLINE CHLORIDE 180 MG: 20 INJECTION, SOLUTION INTRAMUSCULAR; INTRAVENOUS; PARENTERAL at 08:16

## 2020-12-14 RX ADMIN — HYDROMORPHONE HYDROCHLORIDE 0.5 MG: 2 INJECTION, SOLUTION INTRAMUSCULAR; INTRAVENOUS; SUBCUTANEOUS at 08:47

## 2020-12-14 RX ADMIN — RAMIPRIL 10 MG: 10 CAPSULE ORAL at 13:44

## 2020-12-14 RX ADMIN — METOPROLOL TARTRATE 100 MG: 50 TABLET, FILM COATED ORAL at 21:09

## 2020-12-14 RX ADMIN — CEFAZOLIN SODIUM 2 G: 2 INJECTION, SOLUTION INTRAVENOUS at 09:42

## 2020-12-14 RX ADMIN — ROCURONIUM BROMIDE 5 MG: 10 INJECTION INTRAVENOUS at 08:16

## 2020-12-14 RX ADMIN — PROPOFOL 250 MG: 10 INJECTION, EMULSION INTRAVENOUS at 08:16

## 2020-12-14 RX ADMIN — OXYCODONE HYDROCHLORIDE AND ACETAMINOPHEN 1 TABLET: 5; 325 TABLET ORAL at 21:31

## 2020-12-14 RX ADMIN — GLYCOPYRROLATE 0.3 MG: 0.2 INJECTION INTRAMUSCULAR; INTRAVENOUS at 09:50

## 2020-12-14 RX ADMIN — METFORMIN HYDROCHLORIDE 250 MG: 500 TABLET ORAL at 18:25

## 2020-12-14 RX ADMIN — KETOROLAC TROMETHAMINE 15 MG: 15 INJECTION, SOLUTION INTRAMUSCULAR; INTRAVENOUS at 18:30

## 2020-12-14 RX ADMIN — HYDROMORPHONE HYDROCHLORIDE 0.5 MG: 2 INJECTION, SOLUTION INTRAMUSCULAR; INTRAVENOUS; SUBCUTANEOUS at 08:44

## 2020-12-14 RX ADMIN — METFORMIN HYDROCHLORIDE 500 MG: 500 TABLET ORAL at 13:44

## 2020-12-14 RX ADMIN — SODIUM CHLORIDE, POTASSIUM CHLORIDE, SODIUM LACTATE AND CALCIUM CHLORIDE 9 ML/HR: 600; 310; 30; 20 INJECTION, SOLUTION INTRAVENOUS at 07:07

## 2020-12-14 RX ADMIN — TERAZOSIN HYDROCHLORIDE 5 MG: 5 CAPSULE ORAL at 21:09

## 2020-12-14 RX ADMIN — ASPIRIN 325 MG: 325 TABLET, COATED ORAL at 13:44

## 2020-12-14 RX ADMIN — HYDROMORPHONE HYDROCHLORIDE 0.5 MG: 2 INJECTION, SOLUTION INTRAMUSCULAR; INTRAVENOUS; SUBCUTANEOUS at 09:57

## 2020-12-14 RX ADMIN — OXYCODONE HYDROCHLORIDE AND ACETAMINOPHEN 1 TABLET: 5; 325 TABLET ORAL at 22:23

## 2020-12-14 NOTE — PLAN OF CARE
Goal Outcome Evaluation:  Plan of Care Reviewed With: patient     Outcome Summary: Pt is a 69 yo male who presents s/p R TKA demonstrating post op pain, impaired R knee ROM, R LE weakness, impaired gait mechanics, and decreased endurance limiting mobility. Pt was independent with walker at home prior to admission and is currently requiring assist x1 due to above impairments. Pt will benefit from continued PT to address impairments and increase independence with mobility. Pt has walker at home; bsc ordered for home, also. Pt plans to DC home and continue with HH PT.

## 2020-12-14 NOTE — DISCHARGE PLACEMENT REQUEST
"Omar Trinidad (70 y.o. Male)     Date of Birth Social Security Number Address Home Phone MRN    1950  [id number] [id number] [phone] [id number]    Buddhism Marital Status          Church        Admission Date Admission Type Admitting Provider Attending Provider Department, Room/Bed    12/14/20 Elective Vicente Vick MD Sweet, Richard A, MD [address], P796/1    Discharge Date Discharge Disposition Discharge Destination                       Attending Provider: Vicente Vick MD    Allergies: No Known Allergies    Isolation: None   Infection: None   Code Status: CPR    Ht: 182.9 cm (72\")   Wt: 96.9 kg (213 lb 9 oz)    Admission Cmt: None   Principal Problem: None                Active Insurance as of 12/14/2020     Primary Coverage     Payor Plan Insurance Group Employer/Plan Group    HUMANA MEDICARE REPLACEMENT HUMANA MEDICARE REPLACEMENT [id number]     Payor Plan Address Payor Plan Phone Number Payor Plan Fax Number Effective Dates    [address] [address] [phone]  1/1/2018 - None Entered    [facility] [id number]       Subscriber Name Subscriber Birth Date Member ID       OMAR TRINIDAD RAISSA 1950 [id number]                 Emergency Contacts      (Rel.) Home Phone Work Phone Mobile Phone    VIVI NAHUM (Spouse) [phone] -- --              "

## 2020-12-14 NOTE — ANESTHESIA PREPROCEDURE EVALUATION
Anesthesia Evaluation     Patient summary reviewed and Nursing notes reviewed   history of anesthetic complications: PONV  NPO Solid Status: > 8 hours  NPO Liquid Status: > 2 hours           Airway   Mallampati: II  Neck ROM: full  No difficulty expected  Dental    (+) partials    Pulmonary     breath sounds clear to auscultation  (+) a smoker Former,   Cardiovascular     Rhythm: regular    (+) hypertension, CAD, CABG, dysrhythmias Atrial Fib, hyperlipidemia,       Neuro/Psych  GI/Hepatic/Renal/Endo    (+) obesity,   diabetes mellitus,     Musculoskeletal     Abdominal   (+) obese,    Substance History      OB/GYN          Other   arthritis,                      Anesthesia Plan    ASA 3     general     intravenous induction     Anesthetic plan, all risks, benefits, and alternatives have been provided, discussed and informed consent has been obtained with: patient.

## 2020-12-14 NOTE — ANESTHESIA PROCEDURE NOTES
Airway  Urgency: elective    Date/Time: 12/14/2020 8:18 AM  Airway not difficult    General Information and Staff    Patient location during procedure: OR  Anesthesiologist: Mannie Guy MD  CRNA: Cherry Castillo CRNA    Indications and Patient Condition  Indications for airway management: airway protection    Preoxygenated: yes  Mask difficulty assessment: 0 - not attempted    Final Airway Details  Final airway type: endotracheal airway      Successful airway: ETT  Cuffed: yes   Successful intubation technique: direct laryngoscopy  Facilitating devices/methods: intubating stylet  Endotracheal tube insertion site: oral  Blade: Romo  Blade size: 2  ETT size (mm): 7.5  Cormack-Lehane Classification: grade I - full view of glottis  Placement verified by: chest auscultation and capnometry   Measured from: lips  ETT/EBT  to lips (cm): 23  Number of attempts at approach: 1  Assessment: lips, teeth, and gum same as pre-op and atraumatic intubation    Additional Comments  Atraumatic, MOP to cuff, BSBE, no change to dentition, secured with tape

## 2020-12-14 NOTE — PLAN OF CARE
Goal Outcome Evaluation:  Plan of Care Reviewed With: patient     Outcome Summary: Pt is a 71 yo male who presents s/p R TKA demonstrating post op pain, impaired R knee ROM, R LE weakness, impaired gait mechanics, and decreased endurance limiting mobility. Pt was independent with walker at home prior to admission and is currently requiring assist x1 due to above impairments. Pt will benefit from continued PT to address impairments and increase independence with mobility. Pt has walker at home; bsc ordered for home, also. Pt plans to DC home and continue with HH PT.

## 2020-12-14 NOTE — PERIOPERATIVE NURSING NOTE
Attempted to call pts wife to let her know her  has left preop for surgery but no answer. Message left.

## 2020-12-14 NOTE — PROGRESS NOTES
Discharge Planning Assessment  [facility]     Patient Name: Demetrio Buckley  MRN: [id number]  Today's Date: 12/14/2020    Admit Date: 12/14/2020    Discharge Needs Assessment     Row Name 12/14/20 1501       Living Environment    Lives With  spouse;child(stephie), adult    Current Living Arrangements  home/apartment/condo    Potentially Unsafe Housing Conditions  other (see comments) no concerns    Primary Care Provided by  self    Provides Primary Care For  child(stephie)    Family Caregiver if Needed  spouse    Quality of Family Relationships  involved;helpful;supportive    Able to Return to Prior Arrangements  yes       Resource/Environmental Concerns    Resource/Environmental Concerns  none    Home Accessibility Concerns  stairs to enter home       Transition Planning    Patient/Family Anticipates Transition to  home    Patient/Family Anticipated Services at Transition  home health care    Transportation Anticipated  family or friend will provide       Discharge Needs Assessment    Readmission Within the Last 30 Days  no previous admission in last 30 days    Current Outpatient/Agency/Support Group  homecare agency    Equipment Currently Used at Home  walker, standard    Outpatient/Agency/Support Group Needs  homecare agency    Discharge Facility/Level of Care Needs  home with home health        Discharge Plan     Row Name 12/14/20 1503       Plan    Plan  Home with Katherine    Provided Post Acute Provider Quality & Resource List?  Yes    Post Acute Provider Quality and Resource List  Home Health    Delivered To  Patient    Method of Delivery  In person    Patient/Family in Agreement with Plan  yes    Plan Comments  CCP met with patient at bedside. CCP role explained and discharge planning discussed. Face sheet verified. Patient lives with his wife and disabled daughter. Patient has 4 steps to the entrance of his home. Patient’s bedroom and bathroom are on the main level. Patient has a walker at home. Patient has not  used home health or been to SNF. Patient plans to return home. CCP discussed home health and reviewed agencies/Medicare ratings. Patient selected [facility]. If [facility] can not not accept would like [facility] or [facility]. Referral placed in Epic and left voicemail for [facility]. Aurora HERNANDEZ        Continued Care and Services - Admitted Since 12/14/2020     Home Medical Care     Service Provider Request Status Selected Services Address Phone Fax Patient Preferred    [facility]  Pending - Request Sent N/A [address] [place] [address] [phone] --                Demographic Summary     Row Name 12/14/20 1503       General Information    Admission Type  observation    Referral Source  admission list    Reason for Consult  discharge planning    Preferred Language  English        Functional Status     Row Name 12/14/20 1503       Functional Status    Usual Activity Tolerance  good    Current Activity Tolerance  good       Functional Status, IADL    Medications  independent    Meal Preparation  independent    Housekeeping  independent    Laundry  independent    Shopping  independent       Mental Status    General Appearance WDL  WDL       Mental Status Summary    Recent Changes in Mental Status/Cognitive Functioning  no changes        Psychosocial    No documentation.       Abuse/Neglect    No documentation.       Legal    No documentation.       Substance Abuse    No documentation.       Patient Forms    No documentation.           DAVID Sullivan

## 2020-12-14 NOTE — ANESTHESIA POSTPROCEDURE EVALUATION
Patient: Demetrio Buckley    Procedure Summary     Date: 12/14/20 Room / Location: [facility] OR  / [facility] MAIN OR    Anesthesia Start: 0809 Anesthesia Stop: 1012    Procedure: RIGHT TOTAL KNEE ARTHROPLASTY (Right Knee) Diagnosis:     Surgeon: Vicente Vick MD Provider: Mannie Guy MD    Anesthesia Type: general ASA Status: 3          Anesthesia Type: general    Vitals  Vitals Value Taken Time   /80 12/14/20 1130   Temp 36.7 °C (98.1 °F) 12/14/20 1130   Pulse 74 12/14/20 1130   Resp 16 12/14/20 1130   SpO2 98 % 12/14/20 1133   Vitals shown include unvalidated device data.        Post Anesthesia Care and Evaluation      Comments: Pt. Discharged prior to being evaluated by anesthesia.  Chart is reviewed and no complications are noted.  THIS CASE IS NOT MEDICALLY DIRECTED

## 2020-12-14 NOTE — NURSING NOTE
WOCN consult: Stage 2 pressure injury. Patient states he has had wound couple weeks.  Instructed patient to not sit long intervals. May use foam dressing or moisture barrier.  Please order seated waffle cushion. Patient verbalizes understanding     12/14/20 1526   [REMOVED] Wound  coccyx Pressure Injury   Removal Date: 12/14/20  Placement Date: (c)   Present on Hospital Admission: Yes  Location: coccyx  Primary Wound Type: Pressure Injury  Stage, Pressure Injury : Stage 1  Additional Comments: (c)    Wound Image    Wound 12/14/20 coccyx Pressure Injury   Placement Date: 12/14/20   Present on Hospital Admission: Yes  Location: coccyx  Primary Wound Type: Pressure Injury  Stage, Pressure Injury : Stage 2   Dressing Appearance open to air   Base clean;yellow   Periwound intact;redness   Edges open   Drainage Amount none   Care, Wound   (silicone border dressing)   How much help from another person do you currently need...   Turning from your back to your side while in flat bed without using bedrails? 3   Moving from lying on back to sitting on the side of a flat bed without bedrails? 2   Moving to and from a bed to a chair (including a wheelchair)? 3   Standing up from a chair using your arms (e.g., wheelchair, bedside chair)? 3   Climbing 3-5 steps with a railing? 2   To walk in hospital room? 3   AM-PAC 6 Clicks Score (PT) 16

## 2020-12-14 NOTE — OP NOTE
Orthopaedic Surgery   Right Total Knee Replacement  Dr. Vicente Vick   [phone]  Patient Name:  Demetrio Buckley  YOB: 1950  Medical Records Number:  [id number]    Date of Procedure:  12/14/2020    Pre-operative Diagnosis:  DJD Right Knee.  He had severe 11 degrees varus deformity with 15 degree flexion contracture.    Post-operative Diagnosis:  DJD Right Knee.  Deformity as noted above.    Procedure Performed:  Right Total Knee Replacement     Anesthesia: General, supplemented by a periarticular Exparel/bupivacaine injection.      Surgeon: Vicente Vick MD     Assistant: Tiffany Garay PA-C; note that as part of the surgical procedure, I utilized service of an assistant surgeon, specifically Tiffany Garay PA-C. Assistant surgeon participated in crucial portion of the operation. Use of the assistant surgeon greatly reduced overall operative time, thus significantly reducing overall morbidity for the patient.      Implants:    Implant Name Type Inv. Item Serial No.  Lot No. LRB No. Used Action   CMT BONE PALACOS R HI/VISC 1X40 - [id number] Implant CMT BONE PALACOS R HI/VISC 1X40  [employer] [id number] Right 1 Implanted   CMT BONE PALACOS R HI/VISC 1X40 - [id number] Implant CMT BONE PALACOS R HI/VISC 1X40  [employer] [id number] Right 1 Implanted   COMP FEM/KN JOURNEY2 BCS SZ7 RT - [id number] Implant COMP FEM/KN JOURNEY2 BCS SZ7 RT  [employer] [id number] Right 1 Implanted   BASEPLT TIB JOURNEY NONPOR SZ6 RT - [id number] Implant BASEPLT TIB JOURNEY NONPOR SZ6 RT  [employer] [id number] Right 1 Implanted   PATELLA RESRF JOURNEY 7.5X35MM RND - [id number] Implant PATELLA RESRF JOURNEY 7.5X35MM RND  [employer] [id number] Right 1 Implanted   INSRT ART/KN JOURNEY2 BCS XLPE SZ5TO6 9MM RT - [id number] Implant INSRT ART/KN JOURNEY2 BCS XLPE SZ5TO6 9MM RT  [employer] [id number] Right 1 Implanted       Implants utilized: I used the [employer] journey 2  system.  I used a size 6 tibial baseplate.  Size 7 BCS femur.    9 PS mm  polyethylene insert.    35 patella.    Tourniquet Time: Was 59 minutes.      Medications: Note that we gave 1 g IV tranexamic acid when the cement was mixed.      Estimated Blood Loss:   50 mL    Specimens: * No orders in the log *     Complications: None.      Quality Measures: I have documented the patient's current medications including dosage, frequency, and route of administration in medical record today. Conservative alternatives were discussed with the patient as part of the decision-making process prior to the procedure. The patient was evaluated immediately preoperatively for cardiovascular and thromboembolic risk factors.  He has a history of coronary artery disease.  He had an isolated calf DVT in 2004.  Prophylactic IV antibiotics were administered before the tourniquet went up.      Description of Procedure: After prep and drape, Right  knee was approached via midline longitudinal anterior incision. Medial parapatellar arthrotomy was extended to the vastus medialis obliquus which was split in line with the fibers near the medial border, in keeping with minimally invasive technique. Patella was osteotomized proper depth for the patella implant. [place] holes are created. Patella was subluxed and protected. Intramedullary instrumentation was used on each side of the joint; careful and thorough canal content irrigation. I cut the femur 10 mm depth, 5° valgus controlling rotation. The femur  was sized appropriatelyt. Anterior, posterior, and chamfer cuts were made. Tibia is cut 10 mm depth, 5° of posterior slope. Meniscectomies are completed. Trial reduction is performed. Rotation is marked and keel slot was created.  To balance this varus knee with medial compartment bone stock loss and lateral capsular stretching, I release him deep capsular ligament medially.  I remove medial tibial osteophytes.  I resected the PCL via standard  standard instrumentation to resect the intercondylar notch.  Care was taken to protect popliteal nerve as structures.     Bony surface was thoroughly cleaned and dried. I injected the posterior capsule and medial lateral gutter with Exparel solution containing 20 mL Exparel, 25 mL 0.25% bupivacaine with epinephrine, 20 mL saline. Care was taken to protect popliteal neurovascular structures and the peroneal nerve. I cemented the tibial baseplate,  Femur, and patella.  Trial reduction revealed a 9 mm PS polyethylene insert best balanced stability and range of motion, and is locked in the tibial tray.      Tourniquet was released; hemostasis obtained. Wound was closed in layers with #1 Vicryl on the capsule, 2-0 Vicryl in subcutaneous tissue, and zipline in the skin over a 1/8-inch drain. Note that I injected my capsule with my Exparel solution during closure.      Vicente Vick MD  12/14/2020  15:21 EST

## 2020-12-14 NOTE — THERAPY EVALUATION
Patient Name: Demetrio Buckley  : 1950    MRN: [id number]                              Today's Date: 2020       Admit Date: 2020    Visit Dx:     ICD-10-CM ICD-9-CM   1. Osteoarthritis of right knee, unspecified osteoarthritis type  M17.11 715.96     Patient Active Problem List   Diagnosis   • DJD (degenerative joint disease) of knee     Past Medical History:   Diagnosis Date   • Arthritis    • Atherosclerosis    • Coronary artery disease    • Diabetes mellitus (CMS/HCC)     TYPE 2   • Hyperlipidemia    • Hypertension    • Knee pain, right    • Limited joint range of motion     RIGHT KNEE   • Limited range of motion (ROM) of shoulder     RIGHT SHOULDER STIFF, GOING TO HAVE AN MRI ON SHOULDER 2020   • PAF (paroxysmal atrial fibrillation) (CMS/HCC)     FOLLOWS WITH  DR. LANDA   • PONV (postoperative nausea and vomiting)     IN THE PAST     Past Surgical History:   Procedure Laterality Date   • CATARACT EXTRACTION WITH INTRAOCULAR LENS IMPLANT Right    • CHOLECYSTECTOMY OPEN     • COLONOSCOPY     • CORONARY ARTERY BYPASS GRAFT  2004    2 VESSELS   • KNEE ARTHROSCOPY Right     MENISCUS REPAIR   • WISDOM TOOTH EXTRACTION       General Information     Row Name 20 1508          Physical Therapy Time and Intention    Document Type  evaluation  -EE     Mode of Treatment  individual therapy;physical therapy  -EE     Row Name 20 1508          General Information    Patient Profile Reviewed  yes  -EE     Prior Level of Function  independent:;all household mobility;community mobility uses rwx  -EE     Existing Precautions/Restrictions  fall  -EE     Row Name 20 1508          Living Environment    Lives With  spouse  -EE     Row Name 20 1508          Home Main Entrance    Number of Stairs, Main Entrance  two  -EE     Stair Railings, Main Entrance  none  -EE     Row Name 20 1508          Cognition    Orientation Status (Cognition)  oriented x 4  -EE     Row  Name 12/14/20 1508          Safety Issues, Functional Mobility    Impairments Affecting Function (Mobility)  range of motion (ROM);strength;pain;endurance/activity tolerance  -EE       User Key  (r) = Recorded By, (t) = Taken By, (c) = Cosigned By    Initials Name Provider Type    EE Bernadette Genao, PT Physical Therapist        Mobility     Row Name 12/14/20 1514          Bed Mobility    Bed Mobility  rolling left;supine-sit  -EE     Rolling Left Vantage (Bed Mobility)  moderate assist (50% patient effort);verbal cues;nonverbal cues (demo/gesture)  -EE     Supine-Sit Vantage (Bed Mobility)  moderate assist (50% patient effort);minimum assist (75% patient effort);verbal cues  -EE     Assistive Device (Bed Mobility)  head of bed elevated;bed rails  -EE     Row Name 12/14/20 1514          Transfers    Comment (Transfers)  vc's for hand placement  -EE     Row Name 12/14/20 1514          Sit-Stand Transfer    Sit-Stand [place]antage (Transfers)  minimum assist (75% patient effort);verbal cues  -EE     Assistive Device (Sit-Stand Transfers)  walker, front-wheeled  -EE     Row Name 12/14/20 1514          Gait/Stairs (Locomotion)    [place] Level (Gait)  contact guard;verbal cues  -EE     Assistive Device (Gait)  walker, front-wheeled  -EE     Distance in Feet (Gait)  20'  -EE     Deviations/Abnormal Patterns (Gait)  ulises decreased;stride length decreased;right sided deviations;antalgic;festinating/shuffling  -EE     Bilateral Gait Deviations  forward flexed posture  -EE     Right Sided Gait Deviations  heel strike decreased;weight shift ability decreased  -EE     Comment (Gait/Stairs)  vc's for gait sequencing with walker, UE tremors noted due to pt relying heavily on UEs with walker  -EE       User Key  (r) = Recorded By, (t) = Taken By, (c) = Cosigned By    Initials Name Provider Type    EE Bernadette Genao PT Physical Therapist        Obj/Interventions     Row Name 12/14/20 1510          Range of Motion  Comprehensive    General Range of Motion  lower extremity range of motion deficits identified  -EE     Comment, General Range of Motion  R knee flexion ROM impaired ~40%, all other LE ROM grossly WFL for age  -EE     Row Name 12/14/20 1510          Strength Comprehensive (MMT)    General Manual Muscle Testing (MMT) Assessment  lower extremity strength deficits identified  -EE     Comment, General Manual Muscle Testing (MMT) Assessment  R quad limited by pain, grossly 3-/5; L LE strength grossly 4-/5  -EE     Row Name 12/14/20 1510          Motor Skills    Therapeutic Exercise  -- R TKA protocol x10 reps except SLR deferred due to difficulty with SAQ  -EE     Row Name 12/14/20 1510          Sensory Assessment (Somatosensory)    Sensory Assessment (Somatosensory)  sensation intact  -EE       User Key  (r) = Recorded By, (t) = Taken By, (c) = Cosigned By    Initials Name Provider Type    EE Bernadette Genao, PT Physical Therapist        Goals/Plan     Row Name 12/14/20 1513          Bed Mobility Goal 1 (PT)    Activity/Assistive Device (Bed Mobility Goal 1, PT)  bed mobility activities, all  -EE     [place] Level/Cues Needed (Bed Mobility Goal 1, PT)  standby assist  -EE     Time Frame (Bed Mobility Goal 1, PT)  1 week  -EE     Progress/Outcomes (Bed Mobility Goal 1, PT)  goal ongoing  -EE     Row Name 12/14/20 1513          Transfer Goal 1 (PT)    Activity/Assistive Device (Transfer Goal 1, PT)  sit-to-stand/stand-to-sit;bed-to-chair/chair-to-bed  -EE     [place] Level/Cues Needed (Transfer Goal 1, PT)  standby assist  -EE     Time Frame (Transfer Goal 1, PT)  1 week  -EE     Progress/Outcome (Transfer Goal 1, PT)  goal ongoing  -EE     Row Name 12/14/20 1513          Gait Training Goal 1 (PT)    Activity/Assistive Device (Gait Training Goal 1, PT)  gait (walking locomotion);walker, rolling  -EE     [place] Level (Gait Training Goal 1, PT)  contact guard assist  -EE     Time Frame (Gait Training Goal 1, PT)   1 week  -EE     Progress/Outcome (Gait Training Goal 1, PT)  goal ongoing  -EE     Row Name 12/14/20 1513          ROM Goal 1 (PT)    ROM Goal 1 (PT)  R knee ROM 0-90  -EE     Time Frame (ROM Goal 1, PT)  1 week  -EE     Progress/Outcome (ROM Goal 1, PT)  goal ongoing  -EE     Row Name 12/14/20 1513          Stairs Goal 1 (PT)    Activity/Assistive Device (Stairs Goal 1, PT)  stairs, all skills  -EE     [place] Level/Cues Needed (Stairs Goal 1, PT)  minimum assist (75% or more patient effort)  -EE     Number of Stairs (Stairs Goal 1, PT)  2  -EE     Time Frame (Stairs Goal 1, PT)  1 week  -EE     Progress/Outcome (Stairs Goal 1, PT)  goal ongoing  -EE     Row Name 12/14/20 1513          Patient Education Goal (PT)    Activity (Patient Education Goal, PT)  R TKA HEP  -EE     [place]/Cues/Accuracy (Memory Goal 2, PT)  demonstrates adequately;independent  -EE     Time Frame (Patient Education Goal, PT)  1 week  -EE     Progress/Outcome (Patient Education Goal, PT)  goal ongoing  -EE       User Key  (r) = Recorded By, (t) = Taken By, (c) = Cosigned By    Initials Name Provider Type    EE Bernadette Genao, PT Physical Therapist        Clinical Impression     Row Name 12/14/20 1513          Pain    Additional Documentation  Pain Scale: Numbers Pre/Post-Treatment (Group)  -EE     Row Name 12/14/20 1513          Pain Scale: Numbers Pre/Post-Treatment    Pretreatment Pain Rating  5/10  -EE     Posttreatment Pain Rating  6/10  -EE     Pain Location - Side  Right  -EE     Pain Location - Orientation  incisional  -EE     Pain Location  knee  -EE     Pain Intervention(s)  Repositioned;Rest;Medication (See MAR);Cold applied  -EE     Row Name 12/14/20 1513          Plan of Care Review    Plan of Care Reviewed With  patient  -EE     Outcome Summary  Pt is a 71 yo male who presents s/p R TKA demonstrating post op pain, impaired R knee ROM, R LE weakness, impaired gait mechanics, and decreased endurance limiting mobility. Pt  was independent with walker at home prior to admission and is currently requiring assist x1 due to above impairments. Pt will benefit from continued PT to address impairments and increase independence with mobility. Pt has walker at home; [facility] ordered for home, also. Pt plans to DC home and continue with  PT.  -EE     Row Name 12/14/20 1513          Therapy Assessment/Plan (PT)    Rehab Potential (PT)  good, to achieve stated therapy goals  -EE     Criteria for Skilled Interventions Met (PT)  yes;meets criteria;skilled treatment is necessary  -EE     Row Name 12/14/20 1513          Positioning and Restraints    Pre-Treatment Position  in bed  -EE     Post Treatment Position  chair  -EE     In Chair  reclined;call light within reach;encouraged to call for assist;exit alarm on;legs elevated  -EE       User Key  (r) = Recorded By, (t) = Taken By, (c) = Cosigned By    Initials Name Provider Type    Bernadette Brewer PT Physical Therapist        Outcome Measures     Row Name 12/14/20 1526          How much help from another person do you currently need...    Turning from your back to your side while in flat bed without using bedrails?  3  -EE     Moving from lying on back to sitting on the side of a flat bed without bedrails?  2  -EE     Moving to and from a bed to a chair (including a wheelchair)?  3  -EE     Standing up from a chair using your arms (e.g., wheelchair, bedside chair)?  3  -EE     Climbing 3-5 steps with a railing?  2  -EE     To walk in hospital room?  3  -EE     AM-PAC 6 Clicks Score (PT)  16  -EE     Row Name 12/14/20 1526          Functional Assessment    Outcome Measure Options  AM-PAC 6 Clicks Basic Mobility (PT)  -EE       User Key  (r) = Recorded By, (t) = Taken By, (c) = Cosigned By    Initials Name Provider Type    Bernadette Brewer PT Physical Therapist        Physical Therapy Education                 Title: PT OT SLP Therapies (In Progress)     Topic: Physical Therapy (In Progress)     Point:  Mobility training (Done)     Learning Progress Summary           Patient Acceptance, E,TB, VU,NR by EE at 12/14/2020 1517                   Point: Home exercise program (Done)     Learning Progress Summary           Patient Acceptance, E,TB, VU,NR by EE at 12/14/2020 1517                   Point: Body mechanics (Done)     Learning Progress Summary           Patient Acceptance, E,TB, VU,NR by EE at 12/14/2020 1517                   Point: Precautions (Not Started)     Learner Progress:  Not documented in this visit.                      User Key     Initials Effective Dates Name Provider Type Discipline    EE 04/03/18 -  Bernadette Genao, PT Physical Therapist PT              PT Recommendation and Plan  Planned Therapy Interventions (PT): balance training, bed mobility training, gait training, home exercise program, patient/family education, ROM (range of motion), stair training, strengthening, stretching, transfer training  Plan of Care Reviewed With: patient  Outcome Summary: Pt is a 69 yo male who presents s/p R TKA demonstrating post op pain, impaired R knee ROM, R LE weakness, impaired gait mechanics, and decreased endurance limiting mobility. Pt was independent with walker at home prior to admission and is currently requiring assist x1 due to above impairments. Pt will benefit from continued PT to address impairments and increase independence with mobility. Pt has walker at home; [facility] ordered for home, also. Pt plans to DC home and continue with HH PT.     Time Calculation:   PT Charges     Row Name 12/14/20 1517             Time Calculation    Start Time  1458  -EE      Stop Time  1526  -EE      Time Calculation (min)  28 min  -EE      PT Received On  12/14/20  -EE      PT - Next Appointment  12/15/20  -EE      PT Goal Re-Cert Due Date  12/21/20  -EE         Time Calculation- PT    Total Timed Code Minutes- PT  20 minute(s)  -EE        User Key  (r) = Recorded By, (t) = Taken By, (c) = Cosigned By    Initials Name  Provider Type     Bernadette Genao, PT Physical Therapist        Therapy Charges for Today     Code Description Service Date Service Provider Modifiers Qty    88426947461 HC PT EVAL LOW COMPLEXITY 2 12/14/2020 Bernadette Genao, PT GP 1    61679638134 HC PT THER PROC EA 15 MIN 12/14/2020 Bernadette Genao, PT GP 1          PT G-Codes  Outcome Measure Options: AM-PAC 6 Clicks Basic Mobility (PT)  AM-PAC 6 Clicks Score (PT): 16     Patient was intermittently wearing a face mask during this therapy encounter. Therapist used appropriate personal protective equipment including eye protection, mask, and gloves.  Mask used was standard procedure mask. Appropriate PPE was worn during the entire therapy session. Hand hygiene was completed before and after therapy session. Patient is not in enhanced droplet precautions.       Bernadette Genao PT  12/14/2020

## 2020-12-15 ENCOUNTER — APPOINTMENT (OUTPATIENT)
Dept: CT IMAGING | Facility: HOSPITAL | Age: 70
End: 2020-12-15

## 2020-12-15 LAB
ANION GAP SERPL CALCULATED.3IONS-SCNC: 7.3 MMOL/L (ref 5–15)
BUN SERPL-MCNC: 16 MG/DL (ref 8–23)
BUN/CREAT SERPL: 23.2 (ref 7–25)
CALCIUM SPEC-SCNC: 9.6 MG/DL (ref 8.6–10.5)
CHLORIDE SERPL-SCNC: 103 MMOL/L (ref 98–107)
CO2 SERPL-SCNC: 25.7 MMOL/L (ref 22–29)
CREAT SERPL-MCNC: 0.69 MG/DL (ref 0.76–1.27)
GFR SERPL CREATININE-BSD FRML MDRD: 113 ML/MIN/1.73
GLUCOSE BLDC GLUCOMTR-MCNC: 183 MG/DL (ref 70–130)
GLUCOSE BLDC GLUCOMTR-MCNC: 190 MG/DL (ref 70–130)
GLUCOSE BLDC GLUCOMTR-MCNC: 229 MG/DL (ref 70–130)
GLUCOSE BLDC GLUCOMTR-MCNC: 253 MG/DL (ref 70–130)
GLUCOSE SERPL-MCNC: 190 MG/DL (ref 65–99)
HCT VFR BLD AUTO: 30.2 % (ref 37.5–51)
HGB BLD-MCNC: 10.2 G/DL (ref 13–17.7)
POTASSIUM SERPL-SCNC: 3.6 MMOL/L (ref 3.5–5.2)
SODIUM SERPL-SCNC: 136 MMOL/L (ref 136–145)

## 2020-12-15 PROCEDURE — A9270 NON-COVERED ITEM OR SERVICE: HCPCS | Performed by: PHYSICIAN ASSISTANT

## 2020-12-15 PROCEDURE — 63710000001 ATORVASTATIN 20 MG TABLET: Performed by: PHYSICIAN ASSISTANT

## 2020-12-15 PROCEDURE — G0378 HOSPITAL OBSERVATION PER HR: HCPCS

## 2020-12-15 PROCEDURE — 63710000001 METFORMIN 500 MG TABLET: Performed by: PHYSICIAN ASSISTANT

## 2020-12-15 PROCEDURE — A9270 NON-COVERED ITEM OR SERVICE: HCPCS | Performed by: ORTHOPAEDIC SURGERY

## 2020-12-15 PROCEDURE — 63710000001 OXYCODONE-ACETAMINOPHEN 5-325 MG TABLET: Performed by: ORTHOPAEDIC SURGERY

## 2020-12-15 PROCEDURE — 25010000002 KETOROLAC TROMETHAMINE PER 15 MG: Performed by: ORTHOPAEDIC SURGERY

## 2020-12-15 PROCEDURE — 63710000001 ASPIRIN EC 325 MG TABLET DELAYED-RELEASE: Performed by: ORTHOPAEDIC SURGERY

## 2020-12-15 PROCEDURE — 80048 BASIC METABOLIC PNL TOTAL CA: CPT | Performed by: ORTHOPAEDIC SURGERY

## 2020-12-15 PROCEDURE — 63710000001 GLIPIZIDE 5 MG TABLET: Performed by: PHYSICIAN ASSISTANT

## 2020-12-15 PROCEDURE — 85014 HEMATOCRIT: CPT | Performed by: ORTHOPAEDIC SURGERY

## 2020-12-15 PROCEDURE — 71250 CT THORAX DX C-: CPT

## 2020-12-15 PROCEDURE — 97535 SELF CARE MNGMENT TRAINING: CPT

## 2020-12-15 PROCEDURE — 63710000001 FERROUS SULFATE 325 (65 FE) MG TABLET: Performed by: ORTHOPAEDIC SURGERY

## 2020-12-15 PROCEDURE — 82962 GLUCOSE BLOOD TEST: CPT

## 2020-12-15 PROCEDURE — 63710000001 RAMIPRIL 10 MG CAPSULE: Performed by: PHYSICIAN ASSISTANT

## 2020-12-15 PROCEDURE — 63710000001 METOPROLOL TARTRATE 50 MG TABLET: Performed by: PHYSICIAN ASSISTANT

## 2020-12-15 PROCEDURE — 97166 OT EVAL MOD COMPLEX 45 MIN: CPT

## 2020-12-15 PROCEDURE — 25010000003 CEFAZOLIN IN DEXTROSE 2-4 GM/100ML-% SOLUTION: Performed by: ORTHOPAEDIC SURGERY

## 2020-12-15 PROCEDURE — 97530 THERAPEUTIC ACTIVITIES: CPT

## 2020-12-15 PROCEDURE — 97110 THERAPEUTIC EXERCISES: CPT

## 2020-12-15 PROCEDURE — 63710000001 TERAZOSIN 5 MG CAPSULE: Performed by: PHYSICIAN ASSISTANT

## 2020-12-15 PROCEDURE — 85018 HEMOGLOBIN: CPT | Performed by: ORTHOPAEDIC SURGERY

## 2020-12-15 RX ADMIN — METOPROLOL TARTRATE 100 MG: 50 TABLET, FILM COATED ORAL at 20:50

## 2020-12-15 RX ADMIN — ASPIRIN 325 MG: 325 TABLET, COATED ORAL at 08:42

## 2020-12-15 RX ADMIN — ATORVASTATIN CALCIUM 40 MG: 20 TABLET, FILM COATED ORAL at 20:50

## 2020-12-15 RX ADMIN — FERROUS SULFATE TAB 325 MG (65 MG ELEMENTAL FE) 325 MG: 325 (65 FE) TAB at 08:42

## 2020-12-15 RX ADMIN — OXYCODONE HYDROCHLORIDE AND ACETAMINOPHEN 1 TABLET: 5; 325 TABLET ORAL at 06:38

## 2020-12-15 RX ADMIN — METFORMIN HYDROCHLORIDE 250 MG: 500 TABLET ORAL at 16:13

## 2020-12-15 RX ADMIN — KETOROLAC TROMETHAMINE 15 MG: 15 INJECTION, SOLUTION INTRAMUSCULAR; INTRAVENOUS at 00:06

## 2020-12-15 RX ADMIN — OXYCODONE HYDROCHLORIDE AND ACETAMINOPHEN 2 TABLET: 5; 325 TABLET ORAL at 20:50

## 2020-12-15 RX ADMIN — OXYCODONE HYDROCHLORIDE AND ACETAMINOPHEN 2 TABLET: 5; 325 TABLET ORAL at 16:13

## 2020-12-15 RX ADMIN — GLIPIZIDE 5 MG: 5 TABLET ORAL at 06:37

## 2020-12-15 RX ADMIN — CEFAZOLIN SODIUM 2 G: 2 INJECTION, SOLUTION INTRAVENOUS at 02:57

## 2020-12-15 RX ADMIN — RAMIPRIL 10 MG: 10 CAPSULE ORAL at 08:42

## 2020-12-15 RX ADMIN — KETOROLAC TROMETHAMINE 15 MG: 15 INJECTION, SOLUTION INTRAMUSCULAR; INTRAVENOUS at 04:57

## 2020-12-15 RX ADMIN — SODIUM CHLORIDE, PRESERVATIVE FREE 3 ML: 5 INJECTION INTRAVENOUS at 08:45

## 2020-12-15 RX ADMIN — METFORMIN HYDROCHLORIDE 500 MG: 500 TABLET ORAL at 08:42

## 2020-12-15 RX ADMIN — METOPROLOL TARTRATE 100 MG: 50 TABLET, FILM COATED ORAL at 08:41

## 2020-12-15 RX ADMIN — OXYCODONE HYDROCHLORIDE AND ACETAMINOPHEN 2 TABLET: 5; 325 TABLET ORAL at 11:30

## 2020-12-15 RX ADMIN — TERAZOSIN HYDROCHLORIDE 5 MG: 5 CAPSULE ORAL at 20:50

## 2020-12-15 NOTE — PLAN OF CARE
Goal Outcome Evaluation:  Plan of Care Reviewed With: patient  Progress: improving  Outcome Summary: Pt is a 70 year old male s/p R TKA. Pt reports he lives w/ his spouse and his handicapped daughter. Pt reports he was indep PTA w/ assist required for socks d/t R knee limitations. Pt presents to OT eval w/ decreased balance, endurance, geenralized weakness, and overall decreased indep/safety w/ ADLs and transfers. Pt able to complete STS and toilet transfers w/ min A using RW- educ on B UE hand and feet placement w/ RW. Pt ambulates to bathroom using RW w/ CGA- very slow gait and mild unsteadiness noted. Pt able to complete toileting w/ min A and requires total A for LB dressing. Pt will benefit from AE education- unable to complete this date as transport arrvived to take pt down for testing during OT session. Pt may benefit from skilled OT services to address deficits and maximize indep/safety w/ ADLs and transfers. Pt and OT wore standard mask during session and OT also wore gloves, glasses, and performed thorough hand hygiene before and after session.

## 2020-12-15 NOTE — PROGRESS NOTES
"  Orthopaedic Surgery   Daily Progress Note  Dr. Vicente Vick   [phone]  DEMOGRAPHICS:   · Demetrio Buckley   · Age:70 y.o.   · MRN:[id number]  · Admitted: 12/14/2020    PROCEDURE: 1 Day Post-Op s/p Procedure(s):  RIGHT TOTAL KNEE ARTHROPLASTY     /75 (BP Location: Right arm, Patient Position: Lying)   Pulse 86   Temp 98.6 °F (37 °C) (Skin)   Resp 18   Ht 182.9 cm (72\")   Wt 96.9 kg (213 lb 9 oz)   SpO2 93%   BMI 28.96 kg/m²     Lab Results (last 24 hours)     Procedure Component Value Units Date/Time    Basic Metabolic Panel [[id number]] Collected: 12/15/20 0633    Specimen: Blood Updated: 12/15/20 0709    Hemoglobin & Hematocrit, Blood [[id number]] Collected: 12/15/20 0633    Specimen: Blood Updated: 12/15/20 0706    POC Glucose Once [[id number]]  (Abnormal) Collected: 12/15/20 0553    Specimen: Blood Updated: 12/15/20 0554     Glucose 190 mg/dL     POC Glucose Once [[id number]]  (Abnormal) Collected: 12/14/20 2135    Specimen: Blood Updated: 12/14/20 2136     Glucose 218 mg/dL     POC Glucose Once [[id number]]  (Abnormal) Collected: 12/14/20 1644    Specimen: Blood Updated: 12/14/20 1649     Glucose 218 mg/dL     POC Glucose Once [[id number]]  (Abnormal) Collected: 12/14/20 1341    Specimen: Blood Updated: 12/14/20 1342     Glucose 230 mg/dL     POC Glucose Once [[id number]]  (Abnormal) Collected: 12/14/20 1028    Specimen: Blood Updated: 12/14/20 1030     Glucose 196 mg/dL           Imaging Results (Last 24 Hours)     Procedure Component Value Units Date/Time    XR Knee 1 or 2 View Right [[id number]] Collected: 12/14/20 1041     Updated: 12/14/20 1044    Narrative:      XR KNEE 1 OR 2 VW RIGHT-     POSTOP PORTABLE 2 VIEWS RIGHT KNEE     CLINICAL INFORMATION: Post arthroplasty     FINDINGS: Prosthesis is satisfactory in position. A complicating process  is not identified.     This report was finalized on 12/14/2020 10:41 AM by Dr. Jay Diaz M.D.           No labs available    Patient Care " Team:  Matt Holloway MD as PCP - General (Internal Medicine)    SUBJECTIVE  Reasonably comfortable    PHYSICAL EXAM  Wound looks good  Neurovascular intact     ASSESSMENT: Patient is a 70 y.o. male who is 1 Day Post-Op s/p Procedure(s):  1 RIGHT TOTAL KNEE ARTHROPLASTY correcting severe 11 degree varus deformity with 15 degree flexion contracture.  2.  Left knee end-stage primary osteoarthritis with severe 9 degree varus deformity  3.  Diabetes mellitus  4.  Hypertension  5.  Coronary artery disease  6.  Isolated calf DVT in 2004.  I do not believe he is high risk for DVT but we will monitor.  For now I will keep him on aspirin  7.  Preop chest x-ray showed a granuloma.  We will get a CT scan  8.  Immobilization syndrome.  His wife informed me yesterday after surgery that he has really not been out of a chair for 2 months.  He has even been urinating in a jar in his chair.  9.  Small buttock pressure ulcer associated with his immobilization    PLAN / DISPOSITION:  For now aspirin for DVT prophylaxis  CT of his chest  Mobilize  Wound care for the small pressure ulcer  I will probably keep him in the hospital till Thursday try to mobilize him.    Viecnte Vick Sr, MD  Orthopaedic Surgery  [facility]  [phone]

## 2020-12-15 NOTE — PLAN OF CARE
Goal Outcome Evaluation:  Plan of Care Reviewed With: patient  Progress: improving  Outcome Summary: 70/M POD#1 of right TKA. VSS. Room air. Dressing c/d/i. Voiding per urinal. Minimal complaints of pain, managed with PO meds. Up with assist x1 and walker. Educated on glucose monitoring and bp monitoring. Plan is to d/c home when medically stable.

## 2020-12-15 NOTE — PROGRESS NOTES
"Adult Nutrition  Assessment/PES    Patient Name:  Demetrio Buckley  YOB: 1950  MRN: [id number]  Admit Date:  12/14/2020    Assessment Date:  12/15/2020  Nutrition assessment triggered by skin report-coccyx stage 2.  EMR reviewed. S/p R TKA.  Spoke with patient via room phone-great appetite; consumes protein with every meal at home; encouraged to continue while in the hospital-agreed to nutritional supplement-ordered boost glucose control daily to help meet protein needs to aide in skin integrity/wound healing.  Will continue to follow/monitor.    Due to the epidemic and in efforts to preserve PPE, nutrition assessment completed based on review of electronic medical record and/or discussion with (RN/MD). This RD currently working remotely and can be reached via secure chat or email.    Reason for Assessment     Row Name 12/15/20 1635          Reason for Assessment    Reason For Assessment  identified at risk by screening criteria     Diagnosis   s/p R TKA, Left knee end-stage primary osteoarthritis with severe 9 degree varus deformity; DM, HTN, CAD, immobilization syndrome     Identified At Risk by Screening Criteria  large or nonhealing wound, burn or pressure injury coccyx stage 2           Anthropometrics     Row Name 12/15/20 1634          Anthropometrics    Height  182.9 cm (72.01\")        Admit Weight    Admit Weight  96.9 kg (213 lb 10 oz)        Ideal Body Weight (IBW)    Ideal Body Weight (IBW) (kg)  82.09     % of Ideal Body Weight Assessment  -- 118%        Body Mass Index (BMI)    BMI Assessment  BMI 25-29.9: overweight BMI-28.9         Labs/Tests/Procedures/Meds     Row Name 12/15/20 9902          Labs/Procedures/Meds    Lab Results Reviewed  reviewed, pertinent     Lab Results Comments  Glu        Diagnostic Tests/Procedures    Diagnostic Test/Procedure Reviewed  reviewed, pertinent        Medications    Pertinent Medications Reviewed  reviewed, pertinent     Pertinent Medications " "Comments  iron, glipizide, metformin, NaCl         Physical Findings     Row Name 12/15/20 1634          Physical Findings    Overall Physical Appearance  -- B=17     Skin  poor skin integrity/turgor;pressure injury coccyx stage 2 pressure injury         Estimated/Assessed Needs     Row Name 12/15/20 1635 12/15/20 1633       Calculation Measurements    Weight Used For Calculations  96.9 kg (213 lb 10 oz)      Height    182.9 cm (72.01\")       Estimated/Assessed Needs    Additional Documentation  KCAL/KG (Group);Protein Requirements (Group);Fluid Requirements (Group)         KCAL/KG    KCAL/KG  20 Kcal/Kg (kcal);25 Kcal/Kg (kcal)      20 Kcal/Kg (kcal)  1938      25 Kcal/Kg (kcal)  2422.5         Protein Requirements    Weight Used For Protein Calculations  96.9 kg (213 lb 10 oz)      Est Protein Requirement Amount (gms/kg)  1.0 gm protein      Estimated Protein Requirements (gms/day)  96.9         Fluid Requirements    Fluid Requirements (mL/day)  2400      RDA Method (mL)  2400          Nutrition Prescription Ordered     Row Name 12/15/20 1635          Nutrition Prescription PO    Common Modifiers  Consistent Carbohydrate         Evaluation of Received Nutrient/Fluid Intake     Row Name 12/15/20 1635          PO Evaluation    Number of Meals  2     % PO Intake  50               Problem/Interventions:  Problem 1     Row Name 12/15/20 1635          Nutrition Diagnoses Problem 1    Problem 1  Increased Nutrient Needs     Macronutrient  Protein     Etiology (related to)  MNT for Treatment/Condition     Signs/Symptoms (evidenced by)  -- skin integrity/wound healing               Intervention Goal     Row Name 12/15/20 1636          Intervention Goal    General  Maintain nutrition;Meet nutritional needs for age/condition;Reduce/improve symptoms     PO  Tolerate PO;Maintain intake     Weight  Maintain weight         Nutrition Intervention     Row Name 12/15/20 1636          Nutrition Intervention    RD/Tech Action  Follow " Tx progress;Care plan reviewd;Interview for preference;Encourage intake           Education/Evaluation     Row Name 12/15/20 1693          Education    Education  Will Instruct as appropriate        Monitor/Evaluation    Monitor  Per protocol           Electronically signed by:  Ally Johnson RD  12/15/20 16:41 EST

## 2020-12-15 NOTE — THERAPY EVALUATION
Patient Name: Demetrio Buckley  : 1950    MRN: [id number]                              Today's Date: 12/15/2020       Admit Date: 2020    Visit Dx:     ICD-10-CM ICD-9-CM   1. Osteoarthritis of right knee, unspecified osteoarthritis type  M17.11 715.96     Patient Active Problem List   Diagnosis   • DJD (degenerative joint disease) of knee     Past Medical History:   Diagnosis Date   • Arthritis    • Atherosclerosis    • Coronary artery disease    • Diabetes mellitus (CMS/HCC)     TYPE 2   • Hyperlipidemia    • Hypertension    • Knee pain, right    • Limited joint range of motion     RIGHT KNEE   • Limited range of motion (ROM) of shoulder     RIGHT SHOULDER STIFF, GOING TO HAVE AN MRI ON SHOULDER 2020   • PAF (paroxysmal atrial fibrillation) (CMS/HCC)     FOLLOWS WITH  DR. LANDA   • PONV (postoperative nausea and vomiting)     IN THE PAST     Past Surgical History:   Procedure Laterality Date   • CATARACT EXTRACTION WITH INTRAOCULAR LENS IMPLANT Right    • CHOLECYSTECTOMY OPEN     • COLONOSCOPY     • CORONARY ARTERY BYPASS GRAFT  2004    2 VESSELS   • KNEE ARTHROSCOPY Right     MENISCUS REPAIR   • TOTAL KNEE ARTHROPLASTY Right 2020    Procedure: RIGHT TOTAL KNEE ARTHROPLASTY;  Surgeon: Vicente Vick MD;  Location: [facility];  Service: Orthopedics;  Laterality: Right;   • WISDOM TOOTH EXTRACTION       General Information     Row Name 12/15/20 1312          OT Time and Intention    Document Type  evaluation  -RD     Mode of Treatment  occupational therapy  -RD     Row Name 12/15/20 1318          General Information    Patient Profile Reviewed  yes  -RD     Prior Level of Function  independent:;ADL's reports mostly indep but wife was assisting w/ socks lately d/t limitations from R knee  -RD     Existing Precautions/Restrictions  fall  -RD     Row Name 12/15/20 1312          Occupational Profile    Reason for Services/Referral (Occupational Profile)  decreased balance,  endurance, generalized weakness, overall decreased indep/safety w/ ADLs and transfers  -     Row Name 12/15/20 1318          Living Environment    Lives With  spouse;child(stephie), dependent  -     Row Name 12/15/20 1318          Cognition    Orientation Status (Cognition)  oriented x 3  -     Row Name 12/15/20 1318          Safety Issues, Functional Mobility    Impairments Affecting Function (Mobility)  balance;range of motion (ROM);strength;pain;endurance/activity tolerance  -       User Key  (r) = Recorded By, (t) = Taken By, (c) = Cosigned By    Initials Name Provider Type    RD Gillian Hernandez, OT Occupational Therapist          Mobility/ADL's     Row Name 12/15/20 1320          Bed Mobility    Bed Mobility  supine-sit;sit-supine  -     Supine-Sit [place] (Bed Mobility)  contact guard;verbal cues;nonverbal cues (demo/gesture)  -     Sit-Supine [place] (Bed Mobility)  minimum assist (75% patient effort);verbal cues;nonverbal cues (demo/gesture) assist w/ lifting R LE onto bed; performed on transport stretcher  -     Bed Mobility, Safety Issues  decreased use of legs for bridging/pushing  -     Assistive Device (Bed Mobility)  bed rails;head of bed elevated  -     Row Name 12/15/20 1320          Transfers    Transfers  sit-stand transfer;toilet transfer  -     Sit-Stand [place] (Transfers)  minimum assist (75% patient effort);verbal cues  -     [place] Level (Toilet Transfer)  minimum assist (75% patient effort);verbal cues  -     Assistive Device (Toilet Transfer)  walker, front-wheeled;commode, bedside without drop arms BSC positioned over toilet in bathroom  -     Row Name 12/15/20 1320          Sit-Stand Transfer    Assistive Device (Sit-Stand Transfers)  walker, front-wheeled  -     Row Name 12/15/20 1320          Toilet Transfer    Type (Toilet Transfer)  sit-stand;stand-sit  -     Row Name 12/15/20 1320          Functional Mobility    Functional Mobility-  Ind. Level  contact guard assist;verbal cues required  -RD     Functional Mobility- Device  rolling walker  -RD     Functional Mobility- Comment  pt able to ambulate from EOB > Bathroom and later from bathroom > transport stretcher positioned inside room- pt ambulates w/ CGA using RW- very slow gait and some unsteadiness noted, but no overt LOB  -RD     Row Name 12/15/20 1320          Activities of Daily Living    BADL Assessment/Intervention  lower body dressing;grooming;toileting  -RD     Row Name 12/15/20 1320          Lower Body Dressing Assessment/Training    Price Level (Lower Body Dressing)  lower body dressing skills;doff;don;socks;dependent (less than 25% patient effort)  -RD     Position (Lower Body Dressing)  edge of bed sitting  -RD     Comment (Lower Body Dressing)  will benefit from AE education- unable to complete this date d/t transport came to take pt to CT scan during OT session  -RD     Row Name 12/15/20 1320          Grooming Assessment/Training    Price Level (Grooming)  grooming skills;set up;supervision  -RD     Position (Grooming)  edge of bed sitting  -RD     Row Name 12/15/20 1320          Toileting Assessment/Training    Price Level (Toileting)  toileting skills;adjust/manage clothing;perform perineal hygiene;minimum assist (75% patient effort);verbal cues  -RD     Assistive Devices (Toileting)  commode, bedside without drop arms RW  -RD     Position (Toileting)  supported sitting;supported standing  -RD     Comment (Toileting)  min A for clothing mgnt  -RD       User Key  (r) = Recorded By, (t) = Taken By, (c) = Cosigned By    Initials Name Provider Type    Gillian Hopkins, OT Occupational Therapist        Obj/Interventions     Row Name 12/15/20 1324          Sensory Assessment (Somatosensory)    Sensory Assessment (Somatosensory)  UE sensation intact  -RD     Row Name 12/15/20 1324          Range of Motion Comprehensive    General Range of Motion  bilateral  upper extremity ROM WFL  -RD     Row Name 12/15/20 1324          Strength Comprehensive (MMT)    General Manual Muscle Testing (MMT) Assessment  no strength deficits identified  -RD     Comment, General Manual Muscle Testing (MMT) Assessment  B UE MMT: grossly WFL  -RD     Row Name 12/15/20 1324          Balance    Balance Assessment  sitting static balance;standing static balance  -RD     Static Sitting Balance  WFL  -RD     Static Standing Balance  mild impairment CGA for static standing balance w/ RW  -RD       User Key  (r) = Recorded By, (t) = Taken By, (c) = Cosigned By    Initials Name Provider Type    RD Gillian Hernandez, OT Occupational Therapist        Goals/Plan     Row Name 12/15/20 1325          Transfer Goal 1 (OT)    Activity/Assistive Device (Transfer Goal 1, OT)  toilet  -RD     [place] Level/Cues Needed (Transfer Goal 1, OT)  supervision required;standby assist  -RD     Time Frame (Transfer Goal 1, OT)  long term goal (LTG)  -RD     Progress/Outcome (Transfer Goal 1, OT)  goal ongoing  -     Row Name 12/15/20 1325          Dressing Goal 1 (OT)    Activity/Device (Dressing Goal 1, OT)  lower body dressing utilizing AE for increased indep  -RD     [place]/Cues Needed (Dressing Goal 1, OT)  contact guard assist;standby assist  -RD     Time Frame (Dressing Goal 1, OT)  long term goal (LTG)  -RD     Progress/Outcome (Dressing Goal 1, OT)  goal ongoing  -RD     Row Name 12/15/20 1325          Toileting Goal 1 (OT)    Activity/Device (Toileting Goal 1, OT)  toileting skills, all  -RD     [place] Level/Cues Needed (Toileting Goal 1, OT)  standby assist  -RD     Time Frame (Toileting Goal 1, OT)  long term goal (LTG)  -RD     Progress/Outcome (Toileting Goal 1, OT)  goal ongoing  -RD     Row Name 12/15/20 1325          Therapy Assessment/Plan (OT)    Planned Therapy Interventions (OT)  activity tolerance training;BADL retraining;adaptive equipment training;functional balance  retraining;patient/caregiver education/training;ROM/therapeutic exercise;strengthening exercise;transfer/mobility retraining  -RD       User Key  (r) = Recorded By, (t) = Taken By, (c) = Cosigned By    Initials Name Provider Type    RD Gillian Hernandez, OT Occupational Therapist        Clinical Impression     Row Name 12/15/20 8436          Pain Assessment    Additional Documentation  Pain Scale: Numbers Pre/Post-Treatment (Group)  -RD     Row Name 12/15/20 1196          Pain Scale: Numbers Pre/Post-Treatment    Pretreatment Pain Rating  5/10  -RD     Posttreatment Pain Rating  5/10  -RD     Pain Location - Side  Right  -RD     Pain Location - Orientation  lower  -RD     Pain Location  extremity  -RD     Pain Intervention(s)  Repositioned;Rest;Ambulation/increased activity  -RD     Row Name 12/15/20 6659          Plan of Care Review    Plan of Care Reviewed With  patient  -RD     Progress  improving  -RD     Outcome Summary  Pt is a 70 year old male s/p R TKA. Pt reports he lives w/ his spouse and his handicapped daughter. Pt reports he was indep PTA w/ assist required for socks d/t R knee limitations. Pt presents to OT eval w/ decreased balance, endurance, geenralized weakness, and overall decreased indep/safety w/ ADLs and transfers. Pt able to complete STS and toilet transfers w/ min A using RW- educ on B UE hand and feet placement w/ RW. Pt ambulates to bathroom using RW w/ CGA- very slow gait and mild unsteadiness noted. Pt able to complete toileting w/ min A and requires total A for LB dressing. Pt will benefit from AE education- unable to complete this date as transport arrvived to take pt down for testing during OT session. Pt may benefit from skilled OT services to address deficits and maximize indep/safety w/ ADLs and transfers. Pt and OT wore standard mask during session and OT also wore gloves, glasses, and performed thorough hand hygiene before and after session.  -RD     Row Name 12/15/20 6547           Therapy Assessment/Plan (OT)    Rehab Potential (OT)  good, to achieve stated therapy goals  -RD     Criteria for Skilled Therapeutic Interventions Met (OT)  yes;skilled treatment is necessary  -RD     Therapy Frequency (OT)  5 times/wk  -RD     Row Name 12/15/20 1328          Therapy Plan Review/Discharge Plan (OT)    Anticipated Discharge Disposition (OT)  home with assist;home with home health  -RD     Row Name 12/15/20 1328          Vital Signs    O2 Delivery Pre Treatment  room air  -RD     Row Name 12/15/20 1328          Positioning and Restraints    Pre-Treatment Position  in bed  -RD     Post Treatment Position  other on transport stretcher going to CT scan w/ transport team  -RD     Other Position  with other staff on transport stretcher going to CT scan w/ transport team  -RD       User Key  (r) = Recorded By, (t) = Taken By, (c) = Cosigned By    Initials Name Provider Type    Gillian Hopkins OT Occupational Therapist        Outcome Measures     Row Name 12/15/20 1326          How much help from another is currently needed...    Putting on and taking off regular lower body clothing?  2  -RD     Bathing (including washing, rinsing, and drying)  2  -RD     Toileting (which includes using toilet bed pan or urinal)  3  -RD     Putting on and taking off regular upper body clothing  3  -RD     Taking care of personal grooming (such as brushing teeth)  3  -RD     Eating meals  3  -RD     AM-PAC 6 Clicks Score (OT)  16  -RD     Row Name 12/15/20 1326          Functional Assessment    Outcome Measure Options  AM-PAC 6 Clicks Daily Activity (OT)  -RD       User Key  (r) = Recorded By, (t) = Taken By, (c) = Cosigned By    Initials Name Provider Type    Gillian Hopkins OT Occupational Therapist        Occupational Therapy Education                 Title: PT OT SLP Therapies (In Progress)     Topic: Occupational Therapy (In Progress)     Point: ADL training (Done)     Description:   Instruct  learner(s) on proper safety adaptation and remediation techniques during self care or transfers.   Instruct in proper use of assistive devices.              Learning Progress Summary           Patient Acceptance, E,D, VU by RD at 12/15/2020 1327    Comment: OT educ on OT role in therapeutic process and pt's POC. OT also educ on ADL and transfer techniques for increased indep and safety.                   Point: Home exercise program (Not Started)     Description:   Instruct learner(s) on appropriate technique for monitoring, assisting and/or progressing therapeutic exercises/activities.              Learner Progress:  Not documented in this visit.          Point: Precautions (Done)     Description:   Instruct learner(s) on prescribed precautions during self-care and functional transfers.              Learning Progress Summary           Patient Acceptance, E,D, VU by RD at 12/15/2020 1327    Comment: OT educ on OT role in therapeutic process and pt's POC. OT also educ on ADL and transfer techniques for increased indep and safety.                   Point: Body mechanics (Done)     Description:   Instruct learner(s) on proper positioning and spine alignment during self-care, functional mobility activities and/or exercises.              Learning Progress Summary           Patient Acceptance, E,D, VU by RD at 12/15/2020 1327    Comment: OT educ on OT role in therapeutic process and pt's POC. OT also educ on ADL and transfer techniques for increased indep and safety.                               User Key     Initials Effective Dates Name Provider Type Discipline    CAROLINE 10/14/19 -  Gillian Hernandez, OT Occupational Therapist OT              OT Recommendation and Plan  Planned Therapy Interventions (OT): activity tolerance training, BADL retraining, adaptive equipment training, functional balance retraining, patient/caregiver education/training, ROM/therapeutic exercise, strengthening exercise, transfer/mobility  retraining  Therapy Frequency (OT): 5 times/wk  Plan of Care Review  Plan of Care Reviewed With: patient  Progress: improving  Outcome Summary: Pt is a 70 year old male s/p R TKA. Pt reports he lives w/ his spouse and his handicapped daughter. Pt reports he was indep PTA w/ assist required for socks d/t R knee limitations. Pt presents to OT eval w/ decreased balance, endurance, geenralized weakness, and overall decreased indep/safety w/ ADLs and transfers. Pt able to complete STS and toilet transfers w/ min A using RW- educ on B UE hand and feet placement w/ RW. Pt ambulates to bathroom using RW w/ CGA- very slow gait and mild unsteadiness noted. Pt able to complete toileting w/ min A and requires total A for LB dressing. Pt will benefit from AE education- unable to complete this date as transport arrvived to take pt down for testing during OT session. Pt may benefit from skilled OT services to address deficits and maximize indep/safety w/ ADLs and transfers. Pt and OT wore standard mask during session and OT also wore gloves, glasses, and performed thorough hand hygiene before and after session.     Time Calculation:   Time Calculation- OT     Row Name 12/15/20 1333             Time Calculation- OT    OT Start Time  0912  -RD      OT Stop Time  0947  -RD      OT Time Calculation (min)  35 min  -RD      Total Timed Code Minutes- OT  26 minute(s)  -RD      OT Received On  12/15/20  -RD      OT - Next Appointment  12/16/20  -RD      OT Goal Re-Cert Due Date  12/29/20  -RD        User Key  (r) = Recorded By, (t) = Taken By, (c) = Cosigned By    Initials Name Provider Type    Gillian Hopkins OT Occupational Therapist        Therapy Charges for Today     Code Description Service Date Service Provider Modifiers Qty    93049184556 HC OT EVAL MOD COMPLEXITY 2 12/15/2020 Gillian Hernandez OT GO 1    [id number] HC OT SELF CARE/MGMT/TRAIN EA 15 MIN 12/15/2020 Gillian Hernandez OT GO 2               Gillian AKHTAR  David, OT  12/15/2020

## 2020-12-15 NOTE — PLAN OF CARE
Goal Outcome Evaluation:  Plan of Care Reviewed With: patient      POD0 Right Total knee, VSS, afebrile, voiding well, up to chair, ambulating with therapy, pain controlled with po pain medication. Coccyx red with stage 3 pressure injury. Seen by wound nurse with orders for mepelex, seat cushion, Q2turn, HOB<30degrees.

## 2020-12-15 NOTE — PLAN OF CARE
Goal Outcome Evaluation:  Plan of Care Reviewed With: patient  Progress: improving  Outcome Summary: Pt able to increase ambulation distance to 50' but has very slow, antalgic pace. Cues given for gait mechanics but unable to correct much due to pain. Pt required increased time to perform exercises but performed all actively. Will continue to progress as able.    Patient was intermittently wearing a face mask during this therapy encounter. Therapist used appropriate personal protective equipment including eye protection, mask, and gloves.  Mask used was standard procedure mask. Appropriate PPE was worn during the entire therapy session. Hand hygiene was completed before and after therapy session. Patient is not in enhanced droplet precautions.

## 2020-12-15 NOTE — PLAN OF CARE
Goal Outcome Evaluation:  Plan of Care Reviewed With: patient  Progress: improving   PT POD1 Right TKA, VSS, afebrile, voiding well, pain controlled with po pain medication, up in chair today, ambulating in hallway with therapy, CT chest done today,seat cushion in chair while up, Q2turn, dressing to coccyx wound, HOB <30.

## 2020-12-15 NOTE — THERAPY TREATMENT NOTE
Patient Name: Demetrio Buckley  : 1950    MRN: [id number]                              Today's Date: 12/15/2020       Admit Date: 2020    Visit Dx:     ICD-10-CM ICD-9-CM   1. Osteoarthritis of right knee, unspecified osteoarthritis type  M17.11 715.96     Patient Active Problem List   Diagnosis   • DJD (degenerative joint disease) of knee     Past Medical History:   Diagnosis Date   • Arthritis    • Atherosclerosis    • Coronary artery disease    • Diabetes mellitus (CMS/HCC)     TYPE 2   • Hyperlipidemia    • Hypertension    • Knee pain, right    • Limited joint range of motion     RIGHT KNEE   • Limited range of motion (ROM) of shoulder     RIGHT SHOULDER STIFF, GOING TO HAVE AN MRI ON SHOULDER 2020   • PAF (paroxysmal atrial fibrillation) (CMS/HCC)     FOLLOWS WITH  DR. LANDA   • PONV (postoperative nausea and vomiting)     IN THE PAST     Past Surgical History:   Procedure Laterality Date   • CATARACT EXTRACTION WITH INTRAOCULAR LENS IMPLANT Right    • CHOLECYSTECTOMY OPEN     • COLONOSCOPY     • CORONARY ARTERY BYPASS GRAFT  2004    2 VESSELS   • KNEE ARTHROSCOPY Right     MENISCUS REPAIR   • TOTAL KNEE ARTHROPLASTY Right 2020    Procedure: RIGHT TOTAL KNEE ARTHROPLASTY;  Surgeon: Vicente Vick MD;  Location: [facility];  Service: Orthopedics;  Laterality: Right;   • WISDOM TOOTH EXTRACTION       General Information     Row Name 12/15/20 1346          Physical Therapy Time and Intention    Document Type  therapy note (daily note)  -CF     Mode of Treatment  physical therapy  -CF     Row Name 12/15/20 1345          General Information    Patient Profile Reviewed  yes  -CF     Existing Precautions/Restrictions  fall  -CF     Row Name 12/15/20 1346          Cognition    Orientation Status (Cognition)  oriented x 4  -CF     Row Name 12/15/20 1344          Safety Issues, Functional Mobility    Impairments Affecting Function (Mobility)  balance;range of motion  (ROM);strength;pain;endurance/activity tolerance  -CF       User Key  (r) = Recorded By, (t) = Taken By, (c) = Cosigned By    Initials Name Provider Type    CF Mima Huitron PT Physical Therapist        Mobility     Row Name 12/15/20 1340          Bed Mobility    Bed Mobility  supine-sit  -CF     Supine-Sit [place] (Bed Mobility)  verbal cues;minimum assist (75% patient effort);1 person assist;moderate assist (50% patient effort)  -CF     Assistive Device (Bed Mobility)  bed rails;head of bed elevated  -CF     Comment (Bed Mobility)  assist at RLE to move along bed. Pt used UE to pull from PT instead of bed rails  -CF     Row Name 12/15/20 1342          Sit-Stand Transfer    Sit-Stand [place] (Transfers)  verbal cues;minimum assist (75% patient effort);1 person assist  -CF     Assistive Device (Sit-Stand Transfers)  walker, front-wheeled  -CF     Row Name 12/15/20 1347          Gait/Stairs (Locomotion)    [place] Level (Gait)  verbal cues;contact guard  -CF     Assistive Device (Gait)  walker, front-wheeled  -CF     Distance in Feet (Gait)  50'  -CF     Deviations/Abnormal Patterns (Gait)  ulises decreased;stride length decreased;right sided deviations;antalgic;festinating/shuffling  -CF     Bilateral Gait Deviations  forward flexed posture  -CF     Right Sided Gait Deviations  heel strike decreased;weight shift ability decreased  -CF     Comment (Gait/Stairs)  cues for upright posture and reciprocal gait pattern  -CF       User Key  (r) = Recorded By, (t) = Taken By, (c) = Cosigned By    Initials Name Provider Type    CF Mima Huitron PT Physical Therapist        Obj/Interventions     Row Name 12/15/20 1345          Range of Motion Comprehensive    Comment, General Range of Motion  R knee approx 10-60 due to pain  -CF     Row Name 12/15/20 1343          Motor Skills    Therapeutic Exercise  other (see comments) tka exercises x10. increased time required. pt with difficulty with quad set   -CF       User Key  (r) = Recorded By, (t) = Taken By, (c) = Cosigned By    Initials Name Provider Type    CF Mima Huitron PT Physical Therapist        Goals/Plan    No documentation.       Clinical Impression     Row Name 12/15/20 1352          Pain    Additional Documentation  Pain Scale: Numbers Pre/Post-Treatment (Group)  -CF     Row Name 12/15/20 1352          Pain Scale: Numbers Pre/Post-Treatment    Pretreatment Pain Rating  6/10  -CF     Posttreatment Pain Rating  6/10  -CF     Pain Location - Side  Right  -CF     Pain Location  knee  -CF     Pain Intervention(s)  Repositioned;Ambulation/increased activity;Rest  -CF     Row Name 12/15/20 1352          Plan of Care Review    Plan of Care Reviewed With  patient  -CF     Outcome Summary  Pt able to increase ambulation distance to 50' but has very slow, antalgic pace. Cues given for gait mechanics but unable to correct much due to pain. Pt required increased time to perform exercises but performed all actively. Will continue to progress as able.  -CF     Row Name 12/15/20 1352          Vital Signs    O2 Delivery Pre Treatment  room air  -CF     Row Name 12/15/20 1352          Positioning and Restraints    Pre-Treatment Position  in bed  -CF     Post Treatment Position  chair  -CF     In Chair  reclined;call light within reach;encouraged to call for assist;exit alarm on;RLE elevated  -CF       User Key  (r) = Recorded By, (t) = Taken By, (c) = Cosigned By    Initials Name Provider Type    Mima Patel PT Physical Therapist        Outcome Measures     Row Name 12/15/20 3247          How much help from another person do you currently need...    Turning from your back to your side while in flat bed without using bedrails?  3  -CF     Moving from lying on back to sitting on the side of a flat bed without bedrails?  3  -CF     Moving to and from a bed to a chair (including a wheelchair)?  3  -CF     Standing up from a chair using your arms (e.g.,  wheelchair, bedside chair)?  3  -CF     Climbing 3-5 steps with a railing?  2  -CF     To walk in hospital room?  3  -CF     AM-PAC 6 Clicks Score (PT)  17  -CF     Row Name 12/15/20 1349          Functional Assessment    Outcome Measure Options  AM-PAC 6 Clicks Basic Mobility (PT)  -CF       User Key  (r) = Recorded By, (t) = Taken By, (c) = Cosigned By    Initials Name Provider Type    CF Mima Huitron, PT Physical Therapist        Physical Therapy Education                 Title: PT OT SLP Therapies (In Progress)     Topic: Physical Therapy (Done)     Point: Mobility training (Done)     Learning Progress Summary           Patient Acceptance, E, VU,NR by CF at 12/15/2020 1350    Acceptance, E,TB, VU,NR by EE at 12/14/2020 1517                   Point: Home exercise program (Done)     Learning Progress Summary           Patient Acceptance, E, VU,NR by CF at 12/15/2020 1350    Acceptance, E,TB, VU,NR by EE at 12/14/2020 1517                   Point: Body mechanics (Done)     Learning Progress Summary           Patient Acceptance, E, VU,NR by CF at 12/15/2020 1350    Acceptance, E,TB, VU,NR by EE at 12/14/2020 1517                   Point: Precautions (Done)     Learning Progress Summary           Patient Acceptance, E, VU,NR by  at 12/15/2020 1350                               User Key     Initials Effective Dates Name Provider Type Discipline    EE 04/03/18 -  Bernadette Genao, PT Physical Therapist PT     09/02/20 -  Mima Huitron, PT Physical Therapist PT              PT Recommendation and Plan     Plan of Care Reviewed With: patient  Outcome Summary: Pt able to increase ambulation distance to 50' but has very slow, antalgic pace. Cues given for gait mechanics but unable to correct much due to pain. Pt required increased time to perform exercises but performed all actively. Will continue to progress as able.     Time Calculation:   PT Charges     Row Name 12/15/20 3226             Time Calculation     Start Time  1343  -CF      Stop Time  1421  -CF      Time Calculation (min)  38 min  -CF      PT Received On  12/15/20  -CF      PT - Next Appointment  12/16/20  -CF        User Key  (r) = Recorded By, (t) = Taken By, (c) = Cosigned By    Initials Name Provider Type    CF Mima Huitron PT Physical Therapist        Therapy Charges for Today     Code Description Service Date Service Provider Modifiers Qty    32122434183  PT THER PROC EA 15 MIN 12/15/2020 Mima Huitron, PT GP 2    [id number]  PT THERAPEUTIC ACT EA 15 MIN 12/15/2020 Mima Huitron, SHANA GP 1          PT G-Codes  Outcome Measure Options: AM-PAC 6 Clicks Basic Mobility (PT)  AM-PAC 6 Clicks Score (PT): 17  AM-PAC 6 Clicks Score (OT): 16    Mima Huitron PT  12/15/2020

## 2020-12-16 LAB
GLUCOSE BLDC GLUCOMTR-MCNC: 147 MG/DL (ref 70–130)
GLUCOSE BLDC GLUCOMTR-MCNC: 154 MG/DL (ref 70–130)
GLUCOSE BLDC GLUCOMTR-MCNC: 191 MG/DL (ref 70–130)
GLUCOSE BLDC GLUCOMTR-MCNC: 194 MG/DL (ref 70–130)

## 2020-12-16 PROCEDURE — A9270 NON-COVERED ITEM OR SERVICE: HCPCS | Performed by: PHYSICIAN ASSISTANT

## 2020-12-16 PROCEDURE — G0378 HOSPITAL OBSERVATION PER HR: HCPCS

## 2020-12-16 PROCEDURE — 82962 GLUCOSE BLOOD TEST: CPT

## 2020-12-16 PROCEDURE — A9270 NON-COVERED ITEM OR SERVICE: HCPCS | Performed by: ORTHOPAEDIC SURGERY

## 2020-12-16 PROCEDURE — 63710000001 RAMIPRIL 10 MG CAPSULE: Performed by: PHYSICIAN ASSISTANT

## 2020-12-16 PROCEDURE — 63710000001 METFORMIN 500 MG TABLET: Performed by: PHYSICIAN ASSISTANT

## 2020-12-16 PROCEDURE — 63710000001 METOPROLOL TARTRATE 50 MG TABLET: Performed by: PHYSICIAN ASSISTANT

## 2020-12-16 PROCEDURE — 97530 THERAPEUTIC ACTIVITIES: CPT

## 2020-12-16 PROCEDURE — 97110 THERAPEUTIC EXERCISES: CPT

## 2020-12-16 PROCEDURE — 63710000001 ASPIRIN EC 325 MG TABLET DELAYED-RELEASE: Performed by: ORTHOPAEDIC SURGERY

## 2020-12-16 PROCEDURE — 63710000001 ATORVASTATIN 20 MG TABLET: Performed by: PHYSICIAN ASSISTANT

## 2020-12-16 PROCEDURE — 63710000001 FERROUS SULFATE 325 (65 FE) MG TABLET: Performed by: ORTHOPAEDIC SURGERY

## 2020-12-16 PROCEDURE — 63710000001 OXYCODONE-ACETAMINOPHEN 5-325 MG TABLET: Performed by: ORTHOPAEDIC SURGERY

## 2020-12-16 PROCEDURE — 63710000001 TERAZOSIN 5 MG CAPSULE: Performed by: PHYSICIAN ASSISTANT

## 2020-12-16 PROCEDURE — 97535 SELF CARE MNGMENT TRAINING: CPT

## 2020-12-16 PROCEDURE — 63710000001 GLIPIZIDE 5 MG TABLET: Performed by: PHYSICIAN ASSISTANT

## 2020-12-16 RX ADMIN — FERROUS SULFATE TAB 325 MG (65 MG ELEMENTAL FE) 325 MG: 325 (65 FE) TAB at 08:39

## 2020-12-16 RX ADMIN — OXYCODONE HYDROCHLORIDE AND ACETAMINOPHEN 2 TABLET: 5; 325 TABLET ORAL at 21:52

## 2020-12-16 RX ADMIN — SODIUM CHLORIDE, PRESERVATIVE FREE 3 ML: 5 INJECTION INTRAVENOUS at 08:39

## 2020-12-16 RX ADMIN — TERAZOSIN HYDROCHLORIDE 5 MG: 5 CAPSULE ORAL at 20:41

## 2020-12-16 RX ADMIN — SODIUM CHLORIDE, PRESERVATIVE FREE 3 ML: 5 INJECTION INTRAVENOUS at 20:41

## 2020-12-16 RX ADMIN — OXYCODONE HYDROCHLORIDE AND ACETAMINOPHEN 1 TABLET: 5; 325 TABLET ORAL at 18:23

## 2020-12-16 RX ADMIN — METFORMIN HYDROCHLORIDE 500 MG: 500 TABLET ORAL at 08:39

## 2020-12-16 RX ADMIN — OXYCODONE HYDROCHLORIDE AND ACETAMINOPHEN 2 TABLET: 5; 325 TABLET ORAL at 12:29

## 2020-12-16 RX ADMIN — METOPROLOL TARTRATE 100 MG: 50 TABLET, FILM COATED ORAL at 08:39

## 2020-12-16 RX ADMIN — METFORMIN HYDROCHLORIDE 250 MG: 500 TABLET ORAL at 18:23

## 2020-12-16 RX ADMIN — ATORVASTATIN CALCIUM 40 MG: 20 TABLET, FILM COATED ORAL at 20:40

## 2020-12-16 RX ADMIN — ASPIRIN 325 MG: 325 TABLET, COATED ORAL at 08:39

## 2020-12-16 RX ADMIN — GLIPIZIDE 5 MG: 5 TABLET ORAL at 08:39

## 2020-12-16 RX ADMIN — OXYCODONE HYDROCHLORIDE AND ACETAMINOPHEN 2 TABLET: 5; 325 TABLET ORAL at 08:39

## 2020-12-16 RX ADMIN — RAMIPRIL 10 MG: 10 CAPSULE ORAL at 08:39

## 2020-12-16 RX ADMIN — METOPROLOL TARTRATE 100 MG: 50 TABLET, FILM COATED ORAL at 20:41

## 2020-12-16 NOTE — PLAN OF CARE
Goal Outcome Evaluation:  Plan of Care Reviewed With: patient  Progress: improving  Outcome Summary: pain improved today, ambulating assist of 1 with walker, voiding per urn, plan to dc home when medically stable, CT with contrast ordered this evening, VSS, NVI, dressing c/d/i, edcated on glucose meds and monitoring

## 2020-12-16 NOTE — PLAN OF CARE
Goal Outcome Evaluation:  Plan of Care Reviewed With: patient  Progress: no change  Outcome Summary: patient able to ambulate from bed to chair with max assist x2 and verbal cues, voiding without difficulty, pain controlled at this time, educated on b/p and glucose monitoring

## 2020-12-16 NOTE — THERAPY TREATMENT NOTE
Patient Name: Demetrio Buckley  : 1950    MRN: [id number]                              Today's Date: 2020       Admit Date: 2020    Visit Dx:     ICD-10-CM ICD-9-CM   1. Osteoarthritis of right knee, unspecified osteoarthritis type  M17.11 715.96     Patient Active Problem List   Diagnosis   • DJD (degenerative joint disease) of knee     Past Medical History:   Diagnosis Date   • Arthritis    • Atherosclerosis    • Coronary artery disease    • Diabetes mellitus (CMS/HCC)     TYPE 2   • Hyperlipidemia    • Hypertension    • Knee pain, right    • Limited joint range of motion     RIGHT KNEE   • Limited range of motion (ROM) of shoulder     RIGHT SHOULDER STIFF, GOING TO HAVE AN MRI ON SHOULDER 2020   • PAF (paroxysmal atrial fibrillation) (CMS/HCC)     FOLLOWS WITH  DR. LANDA   • PONV (postoperative nausea and vomiting)     IN THE PAST     Past Surgical History:   Procedure Laterality Date   • CATARACT EXTRACTION WITH INTRAOCULAR LENS IMPLANT Right    • CHOLECYSTECTOMY OPEN     • COLONOSCOPY     • CORONARY ARTERY BYPASS GRAFT  2004    2 VESSELS   • KNEE ARTHROSCOPY Right     MENISCUS REPAIR   • TOTAL KNEE ARTHROPLASTY Right 2020    Procedure: RIGHT TOTAL KNEE ARTHROPLASTY;  Surgeon: Vicente Vick MD;  Location: [facility];  Service: Orthopedics;  Laterality: Right;   • WISDOM TOOTH EXTRACTION       General Information     Row Name 20 9965          OT Time and Intention    Document Type  therapy note (daily note)  -RD     Mode of Treatment  occupational therapy  -RD     Row Name 20 133          General Information    Patient Profile Reviewed  yes  -RD     Existing Precautions/Restrictions  fall  -RD     Row Name 20 1230          Cognition    Orientation Status (Cognition)  oriented x 4  -RD       User Key  (r) = Recorded By, (t) = Taken By, (c) = Cosigned By    Initials Name Provider Type    RD Gillian Hernandez, OT Occupational Therapist           Mobility/ADL's     Row Name 12/16/20 1337          Bed Mobility    Bed Mobility  supine-sit;sit-supine  -RD     Supine-Sit [place] (Bed Mobility)  minimum assist (75% patient effort);verbal cues  -RD     Sit-Supine [place] (Bed Mobility)  contact guard;verbal cues  -RD     Bed Mobility, Safety Issues  decreased use of legs for bridging/pushing  -RD     Assistive Device (Bed Mobility)  bed rails;head of bed elevated  -RD     Comment (Bed Mobility)  pt requires assist to raise trunk up to obtain upright sitting position this date  -RD     Row Name 12/16/20 1337          Transfers    Transfers  sit-stand transfer  -RD     Sit-Stand [place] (Transfers)  minimum assist (75% patient effort);verbal cues  -RD     Row Name 12/16/20 1337          Sit-Stand Transfer    Assistive Device (Sit-Stand Transfers)  walker, front-wheeled  -RD     Row Name 12/16/20 1337          Functional Mobility    Functional Mobility- Ind. Level  contact guard assist;verbal cues required  -RD     Functional Mobility- Comment  pt able to take a few side steps toward HOB using RW- continues w/ very slow gait pattern and unsteadiness  -RD     Row Name 12/16/20 1337          Activities of Daily Living    BADL Assessment/Intervention  lower body dressing;grooming  -RD     Row Name 12/16/20 133          Lower Body Dressing Assessment/Training    [place] Level (Lower Body Dressing)  lower body dressing skills;doff;don;socks;set up;standby assist;verbal cues;nonverbal cues (demo/gesture)  -RD     Comment (Lower Body Dressing)  OT educ/demo on AE for increased indep w/ LB dressing including reacher and sockaid- pt able to return demo use by donning/doffing socks using AE- pt requires extra time for tasks  -RD     Row Name 12/16/20 1337          Grooming Assessment/Training    [place] Level (Grooming)  grooming skills;set up;supervision  -RD     Position (Grooming)  edge of bed sitting  -RD       User Key  (r) = Recorded By,  (t) = Taken By, (c) = Cosigned By    Initials Name Provider Type    Gillian Hopkins OT Occupational Therapist        Obj/Interventions     Row Name 12/16/20 1340          Balance    Balance Assessment  sitting static balance;standing static balance  -RD     Static Sitting Balance  WFL  -RD     Static Standing Balance  mild impairment CGA for static standing balance w/ RW  -RD       User Key  (r) = Recorded By, (t) = Taken By, (c) = Cosigned By    Initials Name Provider Type    Gillian Hopkins OT Occupational Therapist        Goals/Plan    No documentation.       Clinical Impression     Row Name 12/16/20 1341          Pain Assessment    Additional Documentation  Pain Scale: FACES Pre/Post-Treatment (Group)  -RD     Row Name 12/16/20 1341          Pain Scale: Numbers Pre/Post-Treatment    Pain Location - Side  Right  -RD     Pain Location - Orientation  lower  -RD     Pain Location  extremity  -RD     Pain Intervention(s)  Ambulation/increased activity;Repositioned;Rest  -RD     Row Name 12/16/20 1341          Pain Scale: FACES Pre/Post-Treatment    Pain: FACES Scale, Pretreatment  4-->hurts little more  -RD     Posttreatment Pain Rating  4-->hurts little more  -RD     Row Name 12/16/20 1341          Plan of Care Review    Plan of Care Reviewed With  patient  -RD     Progress  improving  -RD     Outcome Summary  Pt tolerates OT treatment well w/ slow progress noted. Pt requires min A for bed mobility this date to raise trunk to upright position. Pt continues to require min A for STS transfer and CGA for short functional ambulation using RW- pt declines getting to chair this AM. Following OT educ on AE, pt able to return demo LB dressing to don/doff socks using sockaid and reacher. Pt continues to require signifcant extra time for all tasks and is making slow progress w/ transfers and functional ambulation- discussed w/ RN how pt may require rehab at d/c vs going home w/ HH. Pt and OT wore standard mask  during session and OT also wears gloves, glasses, and performs thorough hand hygiene before and after session.  -RD     Row Name 12/16/20 1341          Therapy Plan Review/Discharge Plan (OT)    Anticipated Discharge Disposition (OT)  inpatient rehabilitation facility;skilled nursing facility pending pt progress  -RD     Row Name 12/16/20 1341          Vital Signs    O2 Delivery Pre Treatment  room air  -RD     Row Name 12/16/20 1341          Positioning and Restraints    Pre-Treatment Position  in bed  -RD     Post Treatment Position  bed  -RD     In Bed  fowlers;call light within reach;encouraged to call for assist;exit alarm on;notified nsg  -RD       User Key  (r) = Recorded By, (t) = Taken By, (c) = Cosigned By    Initials Name Provider Type    Gillian Hopkins OT Occupational Therapist        Outcome Measures     Row Name 12/16/20 1340          How much help from another is currently needed...    Putting on and taking off regular lower body clothing?  3  -RD     Bathing (including washing, rinsing, and drying)  3  -RD     Toileting (which includes using toilet bed pan or urinal)  3  -RD     Putting on and taking off regular upper body clothing  3  -RD     Taking care of personal grooming (such as brushing teeth)  3  -RD     Eating meals  3  -RD     AM-PAC 6 Clicks Score (OT)  18  -RD       User Key  (r) = Recorded By, (t) = Taken By, (c) = Cosigned By    Initials Name Provider Type    Gillian Hopkins OT Occupational Therapist        Occupational Therapy Education                 Title: PT OT SLP Therapies (Done)     Topic: Occupational Therapy (Done)     Point: ADL training (Done)     Description:   Instruct learner(s) on proper safety adaptation and remediation techniques during self care or transfers.   Instruct in proper use of assistive devices.              Learning Progress Summary           Patient Acceptance, E, VU by CAROLINE at 12/16/2020 1341    Comment: OT educ/demo on AE for increased  indep w/ LB dressing including reacher and sockaid- pt able to return demo use by donning/doffing socks using AE    Acceptance, E,TB, VU,DU by ST at 12/15/2020 1617    Acceptance, E,D, VU by RD at 12/15/2020 1327    Comment: OT educ on OT role in therapeutic process and pt's POC. OT also educ on ADL and transfer techniques for increased indep and safety.                   Point: Home exercise program (Done)     Description:   Instruct learner(s) on appropriate technique for monitoring, assisting and/or progressing therapeutic exercises/activities.              Learning Progress Summary           Patient Acceptance, E,TB, VU,DU by  at 12/15/2020 1617                   Point: Precautions (Done)     Description:   Instruct learner(s) on prescribed precautions during self-care and functional transfers.              Learning Progress Summary           Patient Acceptance, E,TB, VU,DU by  at 12/15/2020 1617    Acceptance, E,D, VU by RD at 12/15/2020 1327    Comment: OT educ on OT role in therapeutic process and pt's POC. OT also educ on ADL and transfer techniques for increased indep and safety.                   Point: Body mechanics (Done)     Description:   Instruct learner(s) on proper positioning and spine alignment during self-care, functional mobility activities and/or exercises.              Learning Progress Summary           Patient Acceptance, E,TB, VU,DU by  at 12/15/2020 1617    Acceptance, E,D, VU by RD at 12/15/2020 1327    Comment: OT educ on OT role in therapeutic process and pt's POC. OT also educ on ADL and transfer techniques for increased indep and safety.                               User Key     Initials Effective Dates Name Provider Type Discipline     10/13/17 -  Tonja Reynolds RN Registered Nurse Nurse     10/14/19 -  Gillian Hernandez OT Occupational Therapist OT              OT Recommendation and Plan  Planned Therapy Interventions (OT): activity tolerance training, BADL  retraining, adaptive equipment training, functional balance retraining, patient/caregiver education/training, ROM/therapeutic exercise, strengthening exercise, transfer/mobility retraining  Therapy Frequency (OT): 5 times/wk  Plan of Care Review  Plan of Care Reviewed With: patient  Progress: improving  Outcome Summary: Pt tolerates OT treatment well w/ slow progress noted. Pt requires min A for bed mobility this date to raise trunk to upright position. Pt continues to require min A for STS transfer and CGA for short functional ambulation using RW- pt declines getting to chair this AM. Following OT educ on AE, pt able to return demo LB dressing to don/doff socks using sockaid and reacher. Pt continues to require signifcant extra time for all tasks and is making slow progress w/ transfers and functional ambulation- discussed w/ RN how pt may require rehab at d/c vs going home w/ HH. Pt and OT wore standard mask during session and OT also wears gloves, glasses, and performs thorough hand hygiene before and after session.     Time Calculation:   Time Calculation- OT     Row Name 12/16/20 1349             Time Calculation- OT    OT Start Time  0932  -RD      OT Stop Time  1011  -RD      OT Time Calculation (min)  39 min  -RD      Total Timed Code Minutes- OT  39 minute(s)  -RD      OT Received On  12/16/20  -RD      OT - Next Appointment  12/17/20  -RD        User Key  (r) = Recorded By, (t) = Taken By, (c) = Cosigned By    Initials Name Provider Type    Gillian Hopkins OT Occupational Therapist        Therapy Charges for Today     Code Description Service Date Service Provider Modifiers Qty    96334644957 HC OT EVAL MOD COMPLEXITY 2 12/15/2020 Gillian Hernandez OT GO 1    80353937838 HC OT SELF CARE/MGMT/TRAIN EA 15 MIN 12/15/2020 Gillian Hernandez OT GO 2    81207048634 HC OT SELF CARE/MGMT/TRAIN EA 15 MIN 12/16/2020 Gillian Hernandez OT GO 1    58560916684 HC OT THERAPEUTIC ACT EA 15 MIN 12/16/2020  David, Gillian Sam, OT GO 1               Gillian Hernandez, OT  12/16/2020

## 2020-12-16 NOTE — THERAPY TREATMENT NOTE
Patient Name: Demetrio Buckley  : 1950    MRN: [id number]                              Today's Date: 2020       Admit Date: 2020    Visit Dx:     ICD-10-CM ICD-9-CM   1. Osteoarthritis of right knee, unspecified osteoarthritis type  M17.11 715.96     Patient Active Problem List   Diagnosis   • DJD (degenerative joint disease) of knee     Past Medical History:   Diagnosis Date   • Arthritis    • Atherosclerosis    • Coronary artery disease    • Diabetes mellitus (CMS/HCC)     TYPE 2   • Hyperlipidemia    • Hypertension    • Knee pain, right    • Limited joint range of motion     RIGHT KNEE   • Limited range of motion (ROM) of shoulder     RIGHT SHOULDER STIFF, GOING TO HAVE AN MRI ON SHOULDER 2020   • PAF (paroxysmal atrial fibrillation) (CMS/HCC)     FOLLOWS WITH  DR. LANDA   • PONV (postoperative nausea and vomiting)     IN THE PAST     Past Surgical History:   Procedure Laterality Date   • CATARACT EXTRACTION WITH INTRAOCULAR LENS IMPLANT Right    • CHOLECYSTECTOMY OPEN     • COLONOSCOPY     • CORONARY ARTERY BYPASS GRAFT  2004    2 VESSELS   • KNEE ARTHROSCOPY Right     MENISCUS REPAIR   • TOTAL KNEE ARTHROPLASTY Right 2020    Procedure: RIGHT TOTAL KNEE ARTHROPLASTY;  Surgeon: Vicente Vick MD;  Location: [facility];  Service: Orthopedics;  Laterality: Right;   • WISDOM TOOTH EXTRACTION       General Information     Row Name 20 1029          Physical Therapy Time and Intention    Document Type  therapy note (daily note)  -CF     Mode of Treatment  physical therapy  -CF     Row Name 20 1029          General Information    Patient Profile Reviewed  yes  -CF     Existing Precautions/Restrictions  fall  -CF     Row Name 20 1029          Cognition    Orientation Status (Cognition)  oriented x 4  -CF     Row Name 20 1029          Safety Issues, Functional Mobility    Impairments Affecting Function (Mobility)  balance;range of motion  (ROM);strength;pain;endurance/activity tolerance  -CF       User Key  (r) = Recorded By, (t) = Taken By, (c) = Cosigned By    Initials Name Provider Type    Mima Patel PT Physical Therapist        Mobility     Row Name 12/16/20 1030          Bed Mobility    Bed Mobility  supine-sit  -CF     Row Name 12/16/20 1030          Sit-Stand Transfer    Sit-Stand Bremer (Transfers)  verbal cues;minimum assist (75% patient effort);1 person assist  -CF     Assistive Device (Sit-Stand Transfers)  walker, front-wheeled  -CF     Row Name 12/16/20 1030          Gait/Stairs (Locomotion)    Bremer Level (Gait)  verbal cues;contact guard  -CF     Assistive Device (Gait)  walker, front-wheeled  -CF     Distance in Feet (Gait)  80'  -CF     Deviations/Abnormal Patterns (Gait)  ulises decreased;stride length decreased;right sided deviations;antalgic;festinating/shuffling;gait speed decreased  -CF     Bilateral Gait Deviations  forward flexed posture  -CF     Right Sided Gait Deviations  heel strike decreased;weight shift ability decreased  -CF     Comment (Gait/Stairs)  very slow pace and antalgic, cues to not use UE so much as pt reports pain in shoulders and wrists.  -CF       User Key  (r) = Recorded By, (t) = Taken By, (c) = Cosigned By    Initials Name Provider Type    Mima Patel PT Physical Therapist        Obj/Interventions     Row Name 12/16/20 1031          Motor Skills    Therapeutic Exercise  other (see comments) tka exercises x15, increased time required  -CF       User Key  (r) = Recorded By, (t) = Taken By, (c) = Cosigned By    Initials Name Provider Type    Mima Patel PT Physical Therapist        Goals/Plan    No documentation.       Clinical Impression     Row Name 12/16/20 1040          Pain    Additional Documentation  Pain Scale: Numbers Pre/Post-Treatment (Group)  -CF     Row Name 12/16/20 1040          Pain Scale: Numbers Pre/Post-Treatment    Pretreatment Pain Rating   7/10  -CF     Posttreatment Pain Rating  7/10  -CF     Pain Location - Side  Right  -CF     Pain Location  knee  -CF     Pain Intervention(s)  Repositioned;Cold applied;Ambulation/increased activity;Rest  -CF     Row Name 12/16/20 1040          Plan of Care Review    Plan of Care Reviewed With  patient  -CF     Outcome Summary  Pt able to ambulate 80' today but continues to have very slow, antalgic pace. Pt also lacking about 10 degrees knee extension during exercises and gait. Progress has been slower than expected and pt may benefit from d/c to IRF vs SNF prior to going home to increase functional mobility and IND with mobility. Discussed with pt, and pt agreeable to the idea of rehab.  -CF     Row Name 12/16/20 1040          Vital Signs    O2 Delivery Pre Treatment  room air  -CF     Row Name 12/16/20 1040          Positioning and Restraints    Pre-Treatment Position  in bed  -CF     Post Treatment Position  chair  -CF     In Chair  reclined;call light within reach;encouraged to call for assist;exit alarm on;RLE elevated waffle under bottom in chair  -CF       User Key  (r) = Recorded By, (t) = Taken By, (c) = Cosigned By    Initials Name Provider Type    CF Mima Huitron, PT Physical Therapist        Outcome Measures     Row Name 12/16/20 1031          How much help from another person do you currently need...    Turning from your back to your side while in flat bed without using bedrails?  3  -CF     Moving from lying on back to sitting on the side of a flat bed without bedrails?  3  -CF     Moving to and from a bed to a chair (including a wheelchair)?  3  -CF     Standing up from a chair using your arms (e.g., wheelchair, bedside chair)?  3  -CF     Climbing 3-5 steps with a railing?  3  -CF     To walk in hospital room?  3  -CF     AM-PAC 6 Clicks Score (PT)  18  -CF     Row Name 12/16/20 1031          Functional Assessment    Outcome Measure Options  AM-PAC 6 Clicks Basic Mobility (PT)  -CF       User  Key  (r) = Recorded By, (t) = Taken By, (c) = Cosigned By    Initials Name Provider Type     Mima Huitron, SHANA Physical Therapist        Physical Therapy Education                 Title: PT OT SLP Therapies (Done)     Topic: Physical Therapy (Done)     Point: Mobility training (Done)     Learning Progress Summary           Patient Acceptance, E, VU by  at 12/16/2020 1032    Acceptance, E,TB, VU,DU by  at 12/15/2020 1617    Acceptance, E, VU,NR by  at 12/15/2020 1350    Acceptance, E,TB, VU,NR by  at 12/14/2020 1517                   Point: Home exercise program (Done)     Learning Progress Summary           Patient Acceptance, E, VU by  at 12/16/2020 1032    Acceptance, E,TB, VU,DU by  at 12/15/2020 1617    Acceptance, E, VU,NR by  at 12/15/2020 1350    Acceptance, E,TB, VU,NR by  at 12/14/2020 1517                   Point: Body mechanics (Done)     Learning Progress Summary           Patient Acceptance, E, VU by  at 12/16/2020 1032    Acceptance, E,TB, VU,DU by  at 12/15/2020 1617    Acceptance, E, VU,NR by  at 12/15/2020 1350    Acceptance, E,TB, VU,NR by  at 12/14/2020 1517                   Point: Precautions (Done)     Learning Progress Summary           Patient Acceptance, E, VU by  at 12/16/2020 1032    Acceptance, E,TB, VU,DU by  at 12/15/2020 1617    Acceptance, E, VU,NR by  at 12/15/2020 1350                               User Key     Initials Effective Dates Name Provider Type Discipline     04/03/18 -  Bernadette Genao, PT Physical Therapist PT    ST 10/13/17 -  Tonja Reynolds, RN Registered Nurse Nurse     09/02/20 -  Mima Huitron PT Physical Therapist PT              PT Recommendation and Plan     Plan of Care Reviewed With: patient  Outcome Summary: Pt able to ambulate 80' today but continues to have very slow, antalgic pace. Pt also lacking about 10 degrees knee extension during exercises and gait. Progress has been slower than expected and pt may benefit  from d/c to IRF vs SNF prior to going home to increase functional mobility and IND with mobility. Discussed with pt, and pt agreeable to the idea of rehab.     Time Calculation:   PT Charges     Row Name 12/16/20 1109             Time Calculation    Start Time  1026  -CF      Stop Time  1104  -CF      Time Calculation (min)  38 min  -CF      PT Received On  12/16/20  -CF      PT - Next Appointment  12/17/20  -CF        User Key  (r) = Recorded By, (t) = Taken By, (c) = Cosigned By    Initials Name Provider Type    CF Mima Huitron, PT Physical Therapist        Therapy Charges for Today     Code Description Service Date Service Provider Modifiers Qty    67319276505 HC PT THER PROC EA 15 MIN 12/15/2020 Mima Huitron, PT GP 2    72291868675 HC PT THERAPEUTIC ACT EA 15 MIN 12/15/2020 Mima Huitron, PT GP 1    17644623162 HC PT THER PROC EA 15 MIN 12/16/2020 Mima Huitron, PT GP 2    70277942200 HC PT THERAPEUTIC ACT EA 15 MIN 12/16/2020 Mima Huitron, PT GP 1          PT G-Codes  Outcome Measure Options: AM-PAC 6 Clicks Basic Mobility (PT)  AM-PAC 6 Clicks Score (PT): 18  AM-PAC 6 Clicks Score (OT): 16    Mima Huitron PT  12/16/2020

## 2020-12-16 NOTE — PROGRESS NOTES
"/85 (BP Location: Left arm, Patient Position: Lying)   Pulse 92   Temp 98.2 °F (36.8 °C) (Temporal)   Resp 18   Ht 182.9 cm (72.01\")   Wt 96.9 kg (213 lb 9 oz)   SpO2 93%   BMI 28.96 kg/m²     Results from last 7 days   Lab Units 12/15/20  0633   HEMOGLOBIN g/dL 10.2*   HEMATOCRIT % 30.2*       Results from last 7 days   Lab Units 12/15/20  0633   SODIUM mmol/L 136   POTASSIUM mmol/L 3.6   CHLORIDE mmol/L 103   CO2 mmol/L 25.7   BUN mg/dL 16   CREATININE mg/dL 0.69*   GLUCOSE mg/dL 190*   CALCIUM mg/dL 9.6       Imaging Results (Last 24 Hours)     Procedure Component Value Units Date/Time    CT Chest Without Contrast [[id number]] Collected: 12/15/20 1029     Updated: 12/15/20 1029    Narrative:      CT CHEST WITHOUT CONTRAST     HISTORY: Follow-up from abnormal chest radiograph demonstrating a small  nodule.     TECHNIQUE: Axial CT images of the chest were obtained without  administration of intravenous contrast. Coronal and sagittal reformats  were obtained.     COMPARISON: Chest radiograph dated 12/08/2020.     FINDINGS: The small nodular density seen projecting within the right  upper hemithorax corresponds to a sclerotic abnormality seen within the  posterolateral aspect of the right 6th rib, image 33 series 2. This is  favored to represent a bone island. Calcified coronary artery disease is  present. Changes from prior median sternotomy and CABG are seen. No  pericardial effusion is present. No pathological mediastinal  lymphadenopathy. Trace right pleural effusion is seen. The central  airways are patent without endobronchial lesion. Minimal secretions are  seen within the trachea. There are left basilar areas of scarring and/or  atelectasis. There is a nodular density seen within the superior segment  of the left lower lobe measuring 1.1 x 1.0 cm.     The visualized upper abdomen demonstrates punctate partly imaged right  renal calculi. No pathological axillary lymphadenopathy.       Impression:   "    1. A nodular density seen on recent chest radiograph corresponds to a  sclerotic lesion within the posterolateral right 6th rib and is favored  to represent a bone island.  2. Nodular density measuring up to 1.1 cm within the superior segment of  the left lower lobe. This occurs at the confluence of numerous  bronchovascular structures; however, the morphology is suspicious and  further evaluation with CT chest with contrast for better  characterization and referral to multidisciplinary lung care clinic is  recommended.  3. Small right pleural effusion and left lung base atelectasis and/or  scarring.     Radiation dose reduction techniques were utilized, including automated  exposure control and exposure modulation based on body size.                Patient Care Team:  Matt Holloway MD as PCP - General (Internal Medicine)    SUBJECTIVE  C/o pain as expected  PHYSICAL EXAM  Wound looks great     DJD (degenerative joint disease) of knee      PLAN / DISPOSITION:  PT  Encourage ice  Plan d/c in [facility]  12/16/20  08:10 EST

## 2020-12-16 NOTE — PLAN OF CARE
Goal Outcome Evaluation:  Plan of Care Reviewed With: patient  Progress: no change  Outcome Summary: Pt able to ambulate 80' today but continues to have very slow, antalgic pace. Pt also lacking about 10 degrees knee extension during exercises and gait. Progress has been slower than expected and pt may benefit from d/c to IRF vs SNF prior to going home to increase functional mobility and IND with mobility. Discussed with pt, and pt agreeable to the idea of rehab.    Patient was intermittently wearing a face mask during this therapy encounter. Therapist used appropriate personal protective equipment including eye protection, mask, and gloves.  Mask used was standard procedure mask. Appropriate PPE was worn during the entire therapy session. Hand hygiene was completed before and after therapy session. Patient is not in enhanced droplet precautions.

## 2020-12-16 NOTE — CONSULTS
Pulmonary Consultation     Patient Name: Demetrio Buckley  Age/Sex: 70 y.o. male  : 1950  MRN: [id number]    Date of Admission: 2020  Date of Encounter Visit: 20  Encounter Provider: Lalo Red MD  Referring Provider: Vicente Vick MD  Place of Service: [facility]  Patient Care Team:  Matt Holloway MD as PCP - General (Internal Medicine)      Subjective:     Consulted for: Pulmonary nodule    Chief Complaint: This was a totally incidental finding patient came in because of an orthopedic procedure    History of Present Illness:  Demetrio Buckley is a 70 y.o. male With no history of any lung cancer or lung disease, he did smoke less than half pack per day from the age of 14 until the age of 28 and has been smoking occasional cigars maybe less than once a week since.  He denies any chronic cough, he denies any hemoptysis, he did have some intentional weight loss over the last few years.  He had history of lung cancer in his maternal grandfather who was a very heavy chain smoker.  No pleurisy, no night sweats, no respiratory problem whatsoever.  Patient has history of knee problems in the past and he had a knee surgery which was the reason for this admission, the chest x-ray showed abnormal nodule which was evaluated with a CAT scan that turned out to be benign finding however the nodule of interest was the incidental finding.  Patient has history of coronary artery disease status post CABG with no more problem with  angina or ischemic symptoms, follows regularly with cardiologist.  Denies any dyspnea on exertion  Denies any chronic cough      Pulmonary Functions Testing Results:    No results found for: FEV1, FVC, DUQ7YRL, TLC, DLCO    Review of Systems:   Review of Systems   Constitutional: Negative.    HENT: Negative.    Eyes: Negative.    Respiratory: Negative.    Cardiovascular: Negative.    Gastrointestinal: Negative.    Endocrine: Negative.    Genitourinary: Negative.     Musculoskeletal: Positive for joint swelling.        Knee pain on the right side   Skin: Negative.    Allergic/Immunologic: Negative.    Neurological: Negative.    Hematological: Negative.      Past Medical History:  Past Medical History:   Diagnosis Date   • Arthritis    • Atherosclerosis    • Coronary artery disease    • Diabetes mellitus (CMS/HCC)     TYPE 2   • Hyperlipidemia    • Hypertension    • Knee pain, right    • Limited joint range of motion     RIGHT KNEE   • Limited range of motion (ROM) of shoulder     RIGHT SHOULDER STIFF, GOING TO HAVE AN MRI ON SHOULDER 12-8-2020   • PAF (paroxysmal atrial fibrillation) (CMS/HCC)     FOLLOWS WITH  DR. LANDA   • PONV (postoperative nausea and vomiting)     IN THE PAST       Past Surgical History:   Procedure Laterality Date   • CATARACT EXTRACTION WITH INTRAOCULAR LENS IMPLANT Right    • CHOLECYSTECTOMY OPEN  1980'S   • COLONOSCOPY  2010   • CORONARY ARTERY BYPASS GRAFT  2004    2 VESSELS   • KNEE ARTHROSCOPY Right 2002    MENISCUS REPAIR   • TOTAL KNEE ARTHROPLASTY Right 12/14/2020    Procedure: RIGHT TOTAL KNEE ARTHROPLASTY;  Surgeon: Vicente Vick MD;  Location: [facility];  Service: Orthopedics;  Laterality: Right;   • WISDOM TOOTH EXTRACTION         Home Medications:   Medications Prior to Admission   Medication Sig Dispense Refill Last Dose   • acetaminophen (TYLENOL) 500 MG tablet Take 1,000 mg by mouth Every 6 (Six) Hours As Needed for Mild Pain .   12/13/2020 at 1500   • atorvastatin (LIPITOR) 40 MG tablet Take 40 mg by mouth Every Night.   12/13/2020 at 2200   • glimepiride (AMARYL) 2 MG tablet Take 2 mg by mouth Every Morning Before Breakfast.   12/13/2020 at 1000   • hydroCHLOROthiazide (HYDRODIURIL) 25 MG tablet Take 25 mg by mouth As Needed.   Past Week at Unknown time   • metFORMIN (GLUCOPHAGE) 250 MG half tablet Take 250 mg by mouth Daily With Dinner.   12/13/2020 at 1900   • metFORMIN (GLUCOPHAGE) 500 MG tablet Take 500 mg by mouth Daily  With Breakfast.   2020 at 1000   • metoprolol tartrate (LOPRESSOR) 100 MG tablet Take 100 mg by mouth 2 (Two) Times a Day.   2020 at Unknown time   • ramipril (ALTACE) 10 MG capsule Take 10 mg by mouth Daily.   2020 at 0900   • terazosin (HYTRIN) 5 MG capsule Take 5 mg by mouth Every Night.   2020 at 2200   • aspirin 81 MG EC tablet Take 81 mg by mouth Daily.   2020   • meloxicam (MOBIC) 7.5 MG tablet Take 7.5 mg by mouth Daily.   2020       Inpatient Medications:  Scheduled Meds:aspirin, 325 mg, Oral, Daily  atorvastatin, 40 mg, Oral, Nightly  ferrous sulfate, 325 mg, Oral, Daily With Breakfast  glipizide, 5 mg, Oral, QAM AC  metFORMIN, 250 mg, Oral, Daily With Dinner  metFORMIN, 500 mg, Oral, Daily With Breakfast  metoprolol tartrate, 100 mg, Oral, Q12H  ramipril, 10 mg, Oral, Daily  sodium chloride, 3 mL, Intravenous, Q12H  terazosin, 5 mg, Oral, Nightly      Continuous Infusions:lactated ringers, 9 mL/hr, Last Rate: 9 mL/hr (20 0707)  lactated ringers, 100 mL/hr, Last Rate: Stopped (20)      PRN Meds:.•  acetaminophen **OR** acetaminophen  •  acetaminophen  •  docusate sodium  •  HYDROmorphone **AND** naloxone  •  ondansetron **OR** ondansetron  •  oxyCODONE-acetaminophen  •  oxyCODONE-acetaminophen  •  sodium chloride    Allergies:  No Known Allergies    Past Social History:  Social History     Socioeconomic History   • Marital status:      Spouse name: Not on file   • Number of children: Not on file   • Years of education: Not on file   • Highest education level: Not on file   Tobacco Use   • Smoking status: Former Smoker     Packs/day: 0.75     Years: 14.00     Pack years: 10.50     Types: Cigarettes     Quit date:      Years since quittin.9   • Smokeless tobacco: Never Used   Substance and Sexual Activity   • Alcohol use: Yes     Alcohol/week: 7.0 standard drinks     Types: 7 Glasses of wine per week     Comment: 6 OZ EVERY NIGHT    • Drug  use: Never   • Sexual activity: Defer       Past Family History:  Family History   Problem Relation Age of Onset   • Malig Hyperthermia Neg Hx            Objective:   Temp:  [97 °F (36.1 °C)-98.2 °F (36.8 °C)] 97 °F (36.1 °C)  Heart Rate:  [74-92] 75  Resp:  [16-18] 18  BP: (121-175)/(71-85) 141/84  SpO2:  [93 %-98 %] 94 %  on    Device (Oxygen Therapy): room air     Intake/Output Summary (Last 24 hours) at 12/16/2020 1657  Last data filed at 12/16/2020 1602  Gross per 24 hour   Intake 600 ml   Output 1350 ml   Net -750 ml     Body mass index is 28.96 kg/m².      12/14/20  0651   Weight: 96.9 kg (213 lb 9 oz)     Weight change:     Physical Exam:   Physical Exam   General:    No acute distress, alert and oriented x4, pleasant                   Head:    Normocephalic, atraumatic. External ears and nose are normal   Eyes:          Conjunctivae and sclerae normal, no icterus, PERRLA, no  discharge   Throat:   No oral lesions, no thrush, oral mucosa moist.    Neck:   Supple, trachea midline. No JVD, no cervical or supraclavicular lymphadenopathy    Lungs:     Normal chest on inspection, he does have an old very well-healed sternotomy scar, CTAB, no wheezes. No rhonchi. No crackles. Respirations regular, even and unlabored.      Heart:    Regular rhythm and normal rate.  No murmurs, gallops, or rubs noted.   Abdomen:     Soft, nontender, nondistended, positive bowel sounds. No hepatospleenomegaly    Extremities:   No clubbing, cyanosis, or edema.     Pulses:   Pulses palpable and equal bilaterally.    Skin:   No bleeding or rash. No bumps, good turgor pressure    Neuro:   Nonfocal.  Moves all extremities well (we did not check the lower extremities). Strength 5/5 and symmetrical, no sensory deficit    Psychiatric:   Normal mood and affect.     Lab Review:   Results from last 7 days   Lab Units 12/15/20  0633   SODIUM mmol/L 136   POTASSIUM mmol/L 3.6   CHLORIDE mmol/L 103   CO2 mmol/L 25.7   BUN mg/dL 16   CREATININE  mg/dL 0.69*   GLUCOSE mg/dL 190*   CALCIUM mg/dL 9.6         Results from last 7 days   Lab Units 12/15/20  0633   HEMOGLOBIN g/dL 10.2*   HEMATOCRIT % 30.2*                   Invalid input(s): LDLCALC                                  Results from last 7 days   Lab Units 12/11/20  0815   COVID19  Not Detected             Imaging:  Imaging Results (Most Recent)     Procedure Component Value Units Date/Time    CT Chest Without Contrast [[id number]] Collected: 12/15/20 1029     Updated: 12/15/20 1029    Narrative:      CT CHEST WITHOUT CONTRAST     HISTORY: Follow-up from abnormal chest radiograph demonstrating a small  nodule.     TECHNIQUE: Axial CT images of the chest were obtained without  administration of intravenous contrast. Coronal and sagittal reformats  were obtained.     COMPARISON: Chest radiograph dated 12/08/2020.     FINDINGS: The small nodular density seen projecting within the right  upper hemithorax corresponds to a sclerotic abnormality seen within the  posterolateral aspect of the right 6th rib, image 33 series 2. This is  favored to represent a bone island. Calcified coronary artery disease is  present. Changes from prior median sternotomy and CABG are seen. No  pericardial effusion is present. No pathological mediastinal  lymphadenopathy. Trace right pleural effusion is seen. The central  airways are patent without endobronchial lesion. Minimal secretions are  seen within the trachea. There are left basilar areas of scarring and/or  atelectasis. There is a nodular density seen within the superior segment  of the left lower lobe measuring 1.1 x 1.0 cm.     The visualized upper abdomen demonstrates punctate partly imaged right  renal calculi. No pathological axillary lymphadenopathy.       Impression:      1. A nodular density seen on recent chest radiograph corresponds to a  sclerotic lesion within the posterolateral right 6th rib and is favored  to represent a bone island.  2. Nodular density  measuring up to 1.1 cm within the superior segment of  the left lower lobe. This occurs at the confluence of numerous  bronchovascular structures; however, the morphology is suspicious and  further evaluation with CT chest with contrast for better  characterization and referral to multidisciplinary lung care clinic is  recommended.  3. Small right pleural effusion and left lung base atelectasis and/or  scarring.     Radiation dose reduction techniques were utilized, including automated  exposure control and exposure modulation based on body size.          XR Knee 1 or 2 View Right [[id number]] Collected: 12/14/20 1041     Updated: 12/14/20 1044    Narrative:      XR KNEE 1 OR 2 VW RIGHT-     POSTOP PORTABLE 2 VIEWS RIGHT KNEE     CLINICAL INFORMATION: Post arthroplasty     FINDINGS: Prosthesis is satisfactory in position. A complicating process  is not identified.     This report was finalized on 12/14/2020 10:41 AM by Dr. Jay Diaz M.D.       SCANNED - IMAGING [[id number]] Resulted: 12/14/20      Updated: 12/11/20 1053          I personally viewed and interpreted the patient's imaging studies: The nodule seems to be derived from the blood vessel proximal to it and not sure if this is a vascular structure or a soft tissue mass and without the IV contrast it is difficult to differentiate..    Assessment:     1. Pulmonary nodule  2. History of tobacco abuse  3. Coronary artery disease      Plan:     At this point the recommendation is to evaluate with CT of the chest with IV contrast and then will consider further recommendation accordingly.  Patient has normal renal function and should have no contraindication for IV contrast  His risk for malignancy are relatively small and the structure looks vascular however cannot make any comments until we see the IV contrast study.  This might be very tricky to biopsy if need to be biopsied because of the vascular proximity to it, will consider PET scan  still suspicious on  the IV contrast study.  As this is an AV malformation, may consider embolization only if the patient is symptomatic from it which is not an issue at this point.  We will follow up on the CT with IV contrast which will be ordered today  ,  Cussed with the patient is agreeable with the above plan    Thank you for allowing me to participate in the care of Demetrio Buckley. Feel free to contact me directly with any further questions or concerns.    Lalo Red MD  [facility]   12/16/20  16:57 EST    Dictated utilizing Dragon dictation

## 2020-12-16 NOTE — PLAN OF CARE
Goal Outcome Evaluation:  Plan of Care Reviewed With: patient  Progress: improving  Outcome Summary: Pt tolerates OT treatment well w/ slow progress noted. Pt requires min A for bed mobility this date to raise trunk to upright position. Pt continues to require min A for STS transfer and CGA for short functional ambulation using RW- pt declines getting to chair this AM. Following OT educ on AE, pt able to return demo LB dressing to don/doff socks using sockaid and reacher. Pt continues to require signifcant extra time for all tasks and is making slow progress w/ transfers and functional ambulation- discussed w/ RN how pt may require rehab at d/c vs going home w/ HH. Pt and OT wore standard mask during session and OT also wears gloves, glasses, and performs thorough hand hygiene before and after session.

## 2020-12-17 ENCOUNTER — APPOINTMENT (OUTPATIENT)
Dept: CT IMAGING | Facility: HOSPITAL | Age: 70
End: 2020-12-17

## 2020-12-17 LAB
GLUCOSE BLDC GLUCOMTR-MCNC: 160 MG/DL (ref 70–130)
GLUCOSE BLDC GLUCOMTR-MCNC: 162 MG/DL (ref 70–130)
GLUCOSE BLDC GLUCOMTR-MCNC: 165 MG/DL (ref 70–130)
GLUCOSE BLDC GLUCOMTR-MCNC: 187 MG/DL (ref 70–130)

## 2020-12-17 PROCEDURE — A9270 NON-COVERED ITEM OR SERVICE: HCPCS | Performed by: PHYSICIAN ASSISTANT

## 2020-12-17 PROCEDURE — 97530 THERAPEUTIC ACTIVITIES: CPT

## 2020-12-17 PROCEDURE — A9270 NON-COVERED ITEM OR SERVICE: HCPCS | Performed by: ORTHOPAEDIC SURGERY

## 2020-12-17 PROCEDURE — G0378 HOSPITAL OBSERVATION PER HR: HCPCS

## 2020-12-17 PROCEDURE — 63710000001 METFORMIN 500 MG TABLET: Performed by: PHYSICIAN ASSISTANT

## 2020-12-17 PROCEDURE — 63710000001 METOPROLOL TARTRATE 50 MG TABLET: Performed by: PHYSICIAN ASSISTANT

## 2020-12-17 PROCEDURE — 97110 THERAPEUTIC EXERCISES: CPT

## 2020-12-17 PROCEDURE — 63710000001 GLIPIZIDE 5 MG TABLET: Performed by: PHYSICIAN ASSISTANT

## 2020-12-17 PROCEDURE — 71260 CT THORAX DX C+: CPT

## 2020-12-17 PROCEDURE — 63710000001 ASPIRIN EC 325 MG TABLET DELAYED-RELEASE: Performed by: ORTHOPAEDIC SURGERY

## 2020-12-17 PROCEDURE — 82962 GLUCOSE BLOOD TEST: CPT

## 2020-12-17 PROCEDURE — 63710000001 OXYCODONE-ACETAMINOPHEN 5-325 MG TABLET: Performed by: ORTHOPAEDIC SURGERY

## 2020-12-17 PROCEDURE — 63710000001 RAMIPRIL 10 MG CAPSULE: Performed by: PHYSICIAN ASSISTANT

## 2020-12-17 PROCEDURE — 97535 SELF CARE MNGMENT TRAINING: CPT

## 2020-12-17 PROCEDURE — 63710000001 TERAZOSIN 5 MG CAPSULE: Performed by: PHYSICIAN ASSISTANT

## 2020-12-17 PROCEDURE — 25010000002 IOPAMIDOL 61 % SOLUTION: Performed by: ORTHOPAEDIC SURGERY

## 2020-12-17 PROCEDURE — 63710000001 ATORVASTATIN 20 MG TABLET: Performed by: PHYSICIAN ASSISTANT

## 2020-12-17 PROCEDURE — 63710000001 FERROUS SULFATE 325 (65 FE) MG TABLET: Performed by: ORTHOPAEDIC SURGERY

## 2020-12-17 RX ADMIN — OXYCODONE HYDROCHLORIDE AND ACETAMINOPHEN 2 TABLET: 5; 325 TABLET ORAL at 20:30

## 2020-12-17 RX ADMIN — ATORVASTATIN CALCIUM 40 MG: 20 TABLET, FILM COATED ORAL at 20:31

## 2020-12-17 RX ADMIN — OXYCODONE HYDROCHLORIDE AND ACETAMINOPHEN 2 TABLET: 5; 325 TABLET ORAL at 16:44

## 2020-12-17 RX ADMIN — OXYCODONE HYDROCHLORIDE AND ACETAMINOPHEN 2 TABLET: 5; 325 TABLET ORAL at 08:37

## 2020-12-17 RX ADMIN — OXYCODONE HYDROCHLORIDE AND ACETAMINOPHEN 2 TABLET: 5; 325 TABLET ORAL at 04:37

## 2020-12-17 RX ADMIN — SODIUM CHLORIDE, PRESERVATIVE FREE 3 ML: 5 INJECTION INTRAVENOUS at 09:15

## 2020-12-17 RX ADMIN — GLIPIZIDE 5 MG: 5 TABLET ORAL at 09:15

## 2020-12-17 RX ADMIN — IOPAMIDOL 85 ML: 612 INJECTION, SOLUTION INTRAVENOUS at 08:58

## 2020-12-17 RX ADMIN — OXYCODONE HYDROCHLORIDE AND ACETAMINOPHEN 2 TABLET: 5; 325 TABLET ORAL at 12:43

## 2020-12-17 RX ADMIN — TERAZOSIN HYDROCHLORIDE 5 MG: 5 CAPSULE ORAL at 20:31

## 2020-12-17 RX ADMIN — ASPIRIN 325 MG: 325 TABLET, COATED ORAL at 09:15

## 2020-12-17 RX ADMIN — METOPROLOL TARTRATE 100 MG: 50 TABLET, FILM COATED ORAL at 09:15

## 2020-12-17 RX ADMIN — RAMIPRIL 10 MG: 10 CAPSULE ORAL at 09:15

## 2020-12-17 RX ADMIN — FERROUS SULFATE TAB 325 MG (65 MG ELEMENTAL FE) 325 MG: 325 (65 FE) TAB at 09:15

## 2020-12-17 RX ADMIN — METFORMIN HYDROCHLORIDE 500 MG: 500 TABLET ORAL at 09:15

## 2020-12-17 RX ADMIN — METOPROLOL TARTRATE 100 MG: 50 TABLET, FILM COATED ORAL at 20:30

## 2020-12-17 NOTE — THERAPY TREATMENT NOTE
Patient Name: Demetrio Buckley  : 1950    MRN: [id number]                              Today's Date: 2020       Admit Date: 2020    Visit Dx:     ICD-10-CM ICD-9-CM   1. Pulmonary nodule  R91.1 793.11   2. Osteoarthritis of right knee, unspecified osteoarthritis type  M17.11 715.96     Patient Active Problem List   Diagnosis   • DJD (degenerative joint disease) of knee     Past Medical History:   Diagnosis Date   • Arthritis    • Atherosclerosis    • Coronary artery disease    • Diabetes mellitus (CMS/[facility])     TYPE 2   • Hyperlipidemia    • Hypertension    • Knee pain, right    • Limited joint range of motion     RIGHT KNEE   • Limited range of motion (ROM) of shoulder     RIGHT SHOULDER STIFF, GOING TO HAVE AN MRI ON SHOULDER 2020   • PAF (paroxysmal atrial fibrillation) (CMS/HCC)     FOLLOWS WITH  DR. LANDA   • PONV (postoperative nausea and vomiting)     IN THE PAST     Past Surgical History:   Procedure Laterality Date   • CATARACT EXTRACTION WITH INTRAOCULAR LENS IMPLANT Right    • CHOLECYSTECTOMY OPEN     • COLONOSCOPY     • CORONARY ARTERY BYPASS GRAFT      2 VESSELS   • KNEE ARTHROSCOPY Right     MENISCUS REPAIR   • TOTAL KNEE ARTHROPLASTY Right 2020    Procedure: RIGHT TOTAL KNEE ARTHROPLASTY;  Surgeon: Vicente Vick MD;  Location: [facility];  Service: Orthopedics;  Laterality: Right;   • WISDOM TOOTH EXTRACTION       General Information     Row Name 20 1558          OT Time and Intention    Document Type  therapy note (daily note)  -LE     Mode of Treatment  individual therapy;occupational therapy  -LE     Row Name 20 1558          General Information    Existing Precautions/Restrictions  fall  -LE     Row Name 20 1558          Living Environment    Lives With  spouse  -LE     Row Name 20 1558          Safety Issues, Functional Mobility    Safety Issues Affecting Function (Mobility)  judgment;positioning of assistive  device;problem-solving;insight into deficits/self-awareness  -LE     Impairments Affecting Function (Mobility)  cognition  -LE       User Key  (r) = Recorded By, (t) = Taken By, (c) = Cosigned By    Initials Name Provider Type    Ericka Rolon OTR Occupational Therapist          Mobility/ADL's     Row Name 12/17/20 1559          Bed Mobility    Comment (Bed Mobility)  in chair  -LE     Row Name 12/17/20 [facility]9          Transfers    Transfers  toilet transfer;sit-stand transfer  -LE     Comment (Transfers)  VC for hand placmeent and body mechanics.  pt returns demo after several xfers without prompting  -LE     Sit-Stand [place] (Transfers)  verbal cues;set up;contact guard  -LE     [place] Level (Toilet Transfer)  set up;verbal cues;contact guard  -LE     Assistive Device (Toilet Transfer)  commode, 3-in-1;walker, front-wheeled  -LE     Row Name 12/17/20 1559          Sit-Stand Transfer    Assistive Device (Sit-Stand Transfers)  walker, front-wheeled  -LE     Row Name 12/17/20 [facility]9          Toilet Transfer    Type (Toilet Transfer)  stand pivot/stand step  -     Row Name 12/17/20 [facility]9          Functional Mobility    Functional Mobility- Ind. Level  contact guard assist;verbal cues required  -LE     Functional Mobility- Device  rolling walker  -LE     Functional Mobility-Distance (Feet)  -- to/from bathroom  -LE     Functional Mobility- Safety Issues  balance decreased during turns;sequencing ability decreased;step length decreased  -LE     Functional Mobility- Comment  cues and assist to navigate walker over threshold  -LE     Row Name 12/17/20 [facility]9          Activities of Daily Living    BADL Assessment/Intervention  bathing;lower body dressing;upper body dressing  -LE     Row Name 12/17/20 [facility]9          Lower Body Dressing Assessment/Training    [place] Level (Lower Body Dressing)  doff;don;socks;verbal cues;set up;maximum assist (25% patient effort)  -LE     Position (Lower Body Dressing)   supported sitting  -LE     Comment (Lower Body Dressing)  pt doff L sock, OT doff/lazaro R sock and lazaro L sock  -LE     Row Name 12/17/20 9283          Toileting Assessment/Training    Comment (Toileting)  did not void during toilet xfer  -LE     Row Name 12/17/20 9993          Bathing Assessment/Intervention    [place] Level (Bathing)  lower body;upper body;moderate assist (50% patient effort)  -LE     Position (Bathing)  edge of bed sitting;supported standing  -LE     Comment (Bathing)  pt set up for bath by aid and washing upper body while reclined when enter.assist for lower legs, CGA balance for standing for lower body bathing  -LE     Row Name 12/17/20 4636          Upper Body Dressing Assessment/Training    Comment (Upper Body Dressing)  change gown with cues and CGA to tie  -LE       User Key  (r) = Recorded By, (t) = Taken By, (c) = Cosigned By    Initials Name Provider Type    Ericka Rolon OTR Occupational Therapist        Obj/Interventions    No documentation.       Goals/Plan    No documentation.       Clinical Impression     Row Name 12/17/20 160          Pain Scale: Numbers Pre/Post-Treatment    Pretreatment Pain Rating  5/10  -LE     Posttreatment Pain Rating  5/10  -LE     Pain Location - Side  Right  -LE     Pain Location  knee  -LE     Pain Intervention(s)  Repositioned;Rest  -LE     Row Name 12/17/20 160          Plan of Care Review    Plan of Care Reviewed With  patient  -LE     Outcome Summary  Pt seen for ADL with OT and bathroom xfers with OT today. Pt with improving mobility although slow moving.  -LE     Row Name 12/17/20 1603          Vital Signs    O2 Delivery Pre Treatment  room air  -LE     O2 Delivery Intra Treatment  room air  -LE     O2 Delivery Post Treatment  room air  -LE     Pre Patient Position  Sitting  -LE     Intra Patient Position  Standing  -LE     Post Patient Position  Sitting  -LE     Row Name 12/17/20 1603          Positioning and Restraints    Pre-Treatment  Position  sitting in chair/recliner  -LE     Post Treatment Position  chair  -LE     In Chair  reclined;call light within reach;encouraged to call for assist;exit alarm on  -LE       User Key  (r) = Recorded By, (t) = Taken By, (c) = Cosigned By    Initials Name Provider Type    Ericka Rolon OTR Occupational Therapist        Outcome Measures     Row Name 12/17/20 1605          How much help from another is currently needed...    Putting on and taking off regular lower body clothing?  2  -LE     Bathing (including washing, rinsing, and drying)  3  -LE     Toileting (which includes using toilet bed pan or urinal)  2  -LE     Putting on and taking off regular upper body clothing  3  -LE     Taking care of personal grooming (such as brushing teeth)  3  -LE     Eating meals  4  -LE     AM-PAC 6 Clicks Score (OT)  17  -LE       User Key  (r) = Recorded By, (t) = Taken By, (c) = Cosigned By    Initials Name Provider Type    Ericka Rolon OTR Occupational Therapist        Occupational Therapy Education                 Title: PT OT SLP Therapies (Done)     Topic: Occupational Therapy (Done)     Point: ADL training (Done)     Description:   Instruct learner(s) on proper safety adaptation and remediation techniques during self care or transfers.   Instruct in proper use of assistive devices.              Learning Progress Summary           Patient Acceptance, E, VU by RD at 12/16/2020 1341    Comment: OT educ/demo on AE for increased indep w/ LB dressing including reacher and sockaid- pt able to return demo use by donning/doffing socks using AE    Acceptance, E,TB, VU,DU by ST at 12/15/2020 1617    Acceptance, E,D, VU by RD at 12/15/2020 1327    Comment: OT educ on OT role in therapeutic process and pt's POC. OT also educ on ADL and transfer techniques for increased indep and safety.                   Point: Home exercise program (Done)     Description:   Instruct learner(s) on appropriate technique for monitoring,  assisting and/or progressing therapeutic exercises/activities.              Learning Progress Summary           Patient Acceptance, E,TB, VU,DU by ST at 12/15/2020 1617                   Point: Precautions (Done)     Description:   Instruct learner(s) on prescribed precautions during self-care and functional transfers.              Learning Progress Summary           Patient Acceptance, E,TB, VU,DU by ST at 12/15/2020 1617    Acceptance, E,D, VU by RD at 12/15/2020 1327    Comment: OT educ on OT role in therapeutic process and pt's POC. OT also educ on ADL and transfer techniques for increased indep and safety.                   Point: Body mechanics (Done)     Description:   Instruct learner(s) on proper positioning and spine alignment during self-care, functional mobility activities and/or exercises.              Learning Progress Summary           Patient Acceptance, E,TB, VU,DU by ST at 12/15/2020 1617    Acceptance, E,D, VU by RD at 12/15/2020 1327    Comment: OT educ on OT role in therapeutic process and pt's POC. OT also educ on ADL and transfer techniques for increased indep and safety.                               User Key     Initials Effective Dates Name Provider Type Discipline     10/13/17 -  Tonja Reynolds, RN Registered Nurse Nurse    RD 10/14/19 -  Gillian Hernandez, OT Occupational Therapist OT              OT Recommendation and Plan     Plan of Care Review  Plan of Care Reviewed With: patient  Outcome Summary: Pt seen for ADL with OT and bathroom xfers with OT today. Pt with improving mobility although slow moving.     Time Calculation:   Time Calculation- OT     Row Name 12/17/20 1606             Time Calculation- OT    OT Start Time  1521  -LE      OT Stop Time  1553  -LE      OT Time Calculation (min)  32 min  -LE      Total Timed Code Minutes- OT  32 minute(s)  -LE      OT Received On  12/17/20  -LE      OT - Next Appointment  12/18/20  -LE        User Key  (r) = Recorded By, (t) =  Taken By, (c) = Cosigned By    Initials Name Provider Type    Ericka Rolon, FOUZIA Occupational Therapist        Therapy Charges for Today     Code Description Service Date Service Provider Modifiers Qty    [id number] HC OT SELF CARE/MGMT/TRAIN EA 15 MIN 12/17/2020 Ericka Mccann OTR GO 2               FOUZIA Mayers  12/17/2020

## 2020-12-17 NOTE — THERAPY TREATMENT NOTE
Patient Name: eDmetrio Buckley  : 1950    MRN: [id number]                              Today's Date: 2020       Admit Date: 2020    Visit Dx:     ICD-10-CM ICD-9-CM   1. Pulmonary nodule  R91.1 793.11   2. Osteoarthritis of right knee, unspecified osteoarthritis type  M17.11 715.96     Patient Active Problem List   Diagnosis   • DJD (degenerative joint disease) of knee     Past Medical History:   Diagnosis Date   • Arthritis    • Atherosclerosis    • Coronary artery disease    • Diabetes mellitus (CMS/HCC)     TYPE 2   • Hyperlipidemia    • Hypertension    • Knee pain, right    • Limited joint range of motion     RIGHT KNEE   • Limited range of motion (ROM) of shoulder     RIGHT SHOULDER STIFF, GOING TO HAVE AN MRI ON SHOULDER 2020   • PAF (paroxysmal atrial fibrillation) (CMS/HCC)     FOLLOWS WITH  DR. LANDA   • PONV (postoperative nausea and vomiting)     IN THE PAST     Past Surgical History:   Procedure Laterality Date   • CATARACT EXTRACTION WITH INTRAOCULAR LENS IMPLANT Right    • CHOLECYSTECTOMY OPEN     • COLONOSCOPY     • CORONARY ARTERY BYPASS GRAFT      2 VESSELS   • KNEE ARTHROSCOPY Right     MENISCUS REPAIR   • TOTAL KNEE ARTHROPLASTY Right 2020    Procedure: RIGHT TOTAL KNEE ARTHROPLASTY;  Surgeon: Vicente Vick MD;  Location: [facility];  Service: Orthopedics;  Laterality: Right;   • WISDOM TOOTH EXTRACTION       General Information     Row Name 20 1253          Physical Therapy Time and Intention    Document Type  therapy note (daily note)  -CF     Mode of Treatment  physical therapy  -CF     Row Name 20 1253          General Information    Patient Profile Reviewed  yes  -CF     Existing Precautions/Restrictions  fall  -CF     Row Name 20 1253          Cognition    Orientation Status (Cognition)  oriented x 4  -CF     Row Name 20 1253          Safety Issues, Functional Mobility    Impairments Affecting Function  (Mobility)  balance;range of motion (ROM);strength;pain;endurance/activity tolerance  -CF       User Key  (r) = Recorded By, (t) = Taken By, (c) = Cosigned By    Initials Name Provider Type    CF Mima Huitron PT Physical Therapist        Mobility     Row Name 12/17/20 1253          Bed Mobility    Bed Mobility  supine-sit  -CF     Supine-Sit [place] (Bed Mobility)  verbal cues;minimum assist (75% patient effort)  -CF     Sit-Supine [place] (Bed Mobility)  contact guard;verbal cues  -CF     Assistive Device (Bed Mobility)  bed rails;head of bed elevated  -CF     Row Name 12/17/20 1253          Sit-Stand Transfer    Sit-Stand [place] (Transfers)  minimum assist (75% patient effort);verbal cues  -CF     Assistive Device (Sit-Stand Transfers)  walker, front-wheeled  -CF     Row Name 12/17/20 1253          Gait/Stairs (Locomotion)    [place] Level (Gait)  verbal cues;contact guard  -CF     Assistive Device (Gait)  walker, front-wheeled  -CF     Distance in Feet (Gait)  100'  -CF     Deviations/Abnormal Patterns (Gait)  ulises decreased;stride length decreased;right sided deviations;antalgic;festinating/shuffling;gait speed decreased  -CF     Bilateral Gait Deviations  forward flexed posture  -CF     Right Sided Gait Deviations  heel strike decreased;weight shift ability decreased  -CF     [place] Level (Stairs)  contact guard;verbal cues  -CF     Handrail Location (Stairs)  right side (ascending) 2 UE on R HR  -CF     Number of Steps (Stairs)  2  -CF     Ascending Technique (Stairs)  step-to-step  -CF     Descending Technique (Stairs)  step-to-step  -CF     Comment (Gait/Stairs)  very slow pace, forward flexed, x2 short standing rest breaks to relieve wrist and shoulder pain. Chair follow to gym for stairs  -CF       User Key  (r) = Recorded By, (t) = Taken By, (c) = Cosigned By    Initials Name Provider Type    Mima Patel PT Physical Therapist        Obj/Interventions     Row  Name 12/17/20 1254          Motor Skills    Therapeutic Exercise  other (see comments) tka exercises x10  -CF       User Key  (r) = Recorded By, (t) = Taken By, (c) = Cosigned By    Initials Name Provider Type    CF Mima Huitron PT Physical Therapist        Goals/Plan    No documentation.       Clinical Impression     Row Name 12/17/20 1254          Pain    Additional Documentation  Pain Scale: Numbers Pre/Post-Treatment (Group)  -CF     Row Name 12/17/20 1254          Pain Scale: Numbers Pre/Post-Treatment    Pretreatment Pain Rating  7/10  -CF     Posttreatment Pain Rating  7/10  -CF     Pain Location - Side  Right  -CF     Pain Location  knee  -CF     Pain Intervention(s)  Repositioned;Cold applied;Ambulation/increased activity;Rest  -CF     Row Name 12/17/20 1254          Plan of Care Review    Plan of Care Reviewed With  patient  -CF     Outcome Summary  Pt tolerated tx fair. He continues to require increased time for exercises and mobility. He progressed in ambulation distance to 100' and completed x2 stairs. Pt still has slower progress towards goal than anticipated and requires increased time for all mobility. He would benefit from rehab, but plan appears to be d/c home with HH and assist.  -CF     Row Name 12/17/20 1254          Vital Signs    O2 Delivery Pre Treatment  room air  -CF     Row Name 12/17/20 1254          Positioning and Restraints    Pre-Treatment Position  in bed  -CF     Post Treatment Position  chair  -CF     In Chair  reclined;call light within reach;encouraged to call for assist;exit alarm on;RLE elevated waffle under  -CF       User Key  (r) = Recorded By, (t) = Taken By, (c) = Cosigned By    Initials Name Provider Type    CF Mima Huitron PT Physical Therapist        Outcome Measures     Row Name 12/17/20 1256          How much help from another person do you currently need...    Turning from your back to your side while in flat bed without using bedrails?  3  -CF     Moving  from lying on back to sitting on the side of a flat bed without bedrails?  3  -CF     Moving to and from a bed to a chair (including a wheelchair)?  3  -CF     Standing up from a chair using your arms (e.g., wheelchair, bedside chair)?  3  -CF     Climbing 3-5 steps with a railing?  3  -CF     To walk in hospital room?  3  -CF     AM-PAC 6 Clicks Score (PT)  18  -CF     Row Name 12/17/20 1256          Functional Assessment    Outcome Measure Options  AM-PAC 6 Clicks Basic Mobility (PT)  -CF       User Key  (r) = Recorded By, (t) = Taken By, (c) = Cosigned By    Initials Name Provider Type    CF Mima Huitron, PT Physical Therapist        Physical Therapy Education                 Title: PT OT SLP Therapies (Done)     Topic: Physical Therapy (Done)     Point: Mobility training (Done)     Learning Progress Summary           Patient Acceptance, E, VU,NR by  at 12/17/2020 1256    Acceptance, E, VU by CF at 12/16/2020 1032    Acceptance, E,TB, VU,DU by ST at 12/15/2020 1617    Acceptance, E, VU,NR by CF at 12/15/2020 1350    Acceptance, E,TB, VU,NR by EE at 12/14/2020 1517                   Point: Home exercise program (Done)     Learning Progress Summary           Patient Acceptance, E, VU,NR by CF at 12/17/2020 1256    Acceptance, E, VU by CF at 12/16/2020 1032    Acceptance, E,TB, VU,DU by ST at 12/15/2020 1617    Acceptance, E, VU,NR by CF at 12/15/2020 1350    Acceptance, E,TB, VU,NR by EE at 12/14/2020 1517                   Point: Body mechanics (Done)     Learning Progress Summary           Patient Acceptance, E, VU,NR by CF at 12/17/2020 1256    Acceptance, E, VU by CF at 12/16/2020 1032    Acceptance, E,TB, VU,DU by ST at 12/15/2020 1617    Acceptance, E, VU,NR by CF at 12/15/2020 1350    Acceptance, E,TB, VU,NR by EE at 12/14/2020 1517                   Point: Precautions (Done)     Learning Progress Summary           Patient Acceptance, E, VU,NR by CF at 12/17/2020 1256    Acceptance, E, VU by CF at  12/16/2020 1032    Acceptance, E,TB, VU,DU by ST at 12/15/2020 1617    Acceptance, E, VU,NR by CF at 12/15/2020 1350                               User Key     Initials Effective Dates Name Provider Type Discipline    EE 04/03/18 -  Bernadette Genao, PT Physical Therapist PT    ST 10/13/17 -  Tonja Reynolds, RN Registered Nurse Nurse    CF 09/02/20 -  Mima Huitron PT Physical Therapist PT              PT Recommendation and Plan     Plan of Care Reviewed With: patient  Outcome Summary: Pt tolerated tx fair. He continues to require increased time for exercises and mobility. He progressed in ambulation distance to 100' and completed x2 stairs. Pt still has slower progress towards goal than anticipated and requires increased time for all mobility. He would benefit from rehab, but plan appears to be d/c home with HH and assist.     Time Calculation:   PT Charges     Row Name 12/17/20 1256             Time Calculation    Start Time  1249  -CF      Stop Time  1323  -CF      Time Calculation (min)  34 min  -CF      PT Received On  12/17/20  -CF      PT - Next Appointment  12/18/20  -        User Key  (r) = Recorded By, (t) = Taken By, (c) = Cosigned By    Initials Name Provider Type    CF Mima Huitron, SHANA Physical Therapist        Therapy Charges for Today     Code Description Service Date Service Provider Modifiers Qty    09113730835 HC PT THER PROC EA 15 MIN 12/16/2020 Mima Huitron, PT GP 2    37525713392 HC PT THERAPEUTIC ACT EA 15 MIN 12/16/2020 Mima Huitron, PT GP 1    87052903029 HC PT THER PROC EA 15 MIN 12/17/2020 Mima Huitron, PT GP 1    54874864076 HC PT THERAPEUTIC ACT EA 15 MIN 12/17/2020 Mima Huitron, PT GP 1          PT G-Codes  Outcome Measure Options: AM-PAC 6 Clicks Basic Mobility (PT)  AM-PAC 6 Clicks Score (PT): 18  AM-PAC 6 Clicks Score (OT): 18    Mima Huitron PT  12/17/2020

## 2020-12-17 NOTE — PLAN OF CARE
Goal Outcome Evaluation:  Plan of Care Reviewed With: patient  Progress: improving  Outcome Summary: VSS during shift. Dressing CDI. NVI. Voiding per urinal. Still waiting for his CT with contrast. Plan is to d/c home when stable.

## 2020-12-17 NOTE — PROGRESS NOTES
"  PROGRESS NOTE  Patient Name: Demetrio Buckley  Age/Sex: 70 y.o. male  : 1950  MRN: [id number]    Date of Admission: 2020  Date of Encounter Visit: 20   LOS: 0 days   Patient Care Team:  Matt Holloway MD as PCP - General (Internal Medicine)    Chief Complaint: Pulmonary nodule    Hospital course: Patient is here for knee surgery that nodule was incidental finding on the CAT scan    Interval History: Patient had a noncontrast CAT scan that showed a nodule and was further evaluated with an IV contrast CT scan that was reviewed and discussed with the patient today.  He is recovering from his knee surgery he denies any respiratory complaints.  No fever no chills no cough no hemoptysis.    REVIEW OF SYSTEMS:   CONSTITUTIONAL: no fever or chills  CARDIOVASCULAR: No chest pain, chest pressure or chest discomfort. No palpitations or edema.   RESPIRATORY: No shortness of breath, cough or sputum.   GASTROINTESTINAL: No anorexia, nausea, vomiting or diarrhea. No abdominal pain or blood.   HEMATOLOGIC: No bleeding or bruising.     Ventilator/Non-Invasive Ventilation Settings (From admission, onward)    None            Vital Signs  Temp:  [97 °F (36.1 °C)-98.2 °F (36.8 °C)] 97.8 °F (36.6 °C)  Heart Rate:  [73-87] 76  Resp:  [16-18] 16  BP: (132-171)/(77-88) 149/81  SpO2:  [94 %-96 %] 96 %  on    Device (Oxygen Therapy): room air    Intake/Output Summary (Last 24 hours) at 2020 1412  Last data filed at 2020 1100  Gross per 24 hour   Intake 360 ml   Output 475 ml   Net -115 ml     Flowsheet Rows      First Filed Value   Admission Height  182.9 cm (72\") Documented at 2020 0651   Admission Weight  96.9 kg (213 lb 9 oz) Documented at 2020 0651        Body mass index is 28.96 kg/m².      20  0651   Weight: 96.9 kg (213 lb 9 oz)       Physical Exam:  GEN:  No acute distress, alert, cooperative, well developed, on room air sitting in the chair  EYES:   Sclerae clear. No " icterus. PERRL. Normal EOM  ENT:   External ears/nose normal, no oral lesions, no thrush, mucous membranes moist  NECK:  Supple, midline trachea, no JVD  LUNGS: Normal chest on inspection, CTAB, no wheezes. No rhonchi. No crackles. Respirations regular, even and unlabored.   CV:  Regular rhythm and rate. Normal S1/S2. No murmurs, gallops, or rubs noted.  ABD:  Soft, nontender and nondistended. Normal bowel sounds. No guarding  EXT: Moving upper extremity very well, did not check lower extremities no cyanosis. No redness. No edema.   Skin: Dry, intact, no bleeding    Results Review:        Results from last 7 days   Lab Units 12/15/20  0633   SODIUM mmol/L 136   POTASSIUM mmol/L 3.6   CHLORIDE mmol/L 103   CO2 mmol/L 25.7   BUN mg/dL 16   CREATININE mg/dL 0.69*   CALCIUM mg/dL 9.6   ANION GAP mmol/L 7.3                 Results from last 7 days   Lab Units 12/15/20  0633   HEMOGLOBIN g/dL 10.2*   HEMATOCRIT % 30.2*                   Invalid input(s): LDLCALC          Glucose   Date/Time Value Ref Range Status   12/17/2020 1043 160 (H) 70 - 130 mg/dL Final   12/17/2020 0616 162 (H) 70 - 130 mg/dL Final   12/16/2020 2119 154 (H) 70 - 130 mg/dL Final   12/16/2020 1634 147 (H) 70 - 130 mg/dL Final   12/16/2020 1124 194 (H) 70 - 130 mg/dL Final   12/16/2020 0619 191 (H) 70 - 130 mg/dL Final   12/15/2020 2116 183 (H) 70 - 130 mg/dL Final   12/15/2020 1545 253 (H) 70 - 130 mg/dL Final                 Results from last 7 days   Lab Units 12/11/20  0815   COVID19  Not Detected               Imaging:   Imaging Results (All)     Procedure Component Value Units Date/Time    CT Chest Without Contrast [[id number]] Collected: 12/15/20 1029     Updated: 12/17/20 1405    Narrative:      CT CHEST WITHOUT CONTRAST     HISTORY: Follow-up from abnormal chest radiograph demonstrating a small  nodule.     TECHNIQUE: Axial CT images of the chest were obtained without  administration of intravenous contrast. Coronal and sagittal  reformats  were obtained.     COMPARISON: Chest radiograph dated 12/08/2020.     FINDINGS: The small nodular density seen projecting within the right  upper hemithorax corresponds to a sclerotic abnormality seen within the  posterolateral aspect of the right 6th rib, image 33 series 2. This is  favored to represent a bone island. Calcified coronary artery disease is  present. Changes from prior median sternotomy and CABG are seen. No  pericardial effusion is present. No pathological mediastinal  lymphadenopathy. Trace right pleural effusion is seen. The central  airways are patent without endobronchial lesion. Minimal secretions are  seen within the trachea. There are left basilar areas of scarring and/or  atelectasis. There is a nodular density seen within the superior segment  of the left lower lobe measuring 1.1 x 1.0 cm.     The visualized upper abdomen demonstrates punctate partly imaged right  renal calculi. No pathological axillary lymphadenopathy.       Impression:      1. A nodular density seen on recent chest radiograph corresponds to a  sclerotic lesion within the posterolateral right 6th rib and is favored  to represent a bone island.  2. Nodular density measuring up to 1.1 cm within the superior segment of  the left lower lobe. This occurs at the confluence of numerous  bronchovascular structures; however, the morphology is suspicious and  further evaluation with CT chest with contrast for better  characterization and referral to multidisciplinary lung care clinic is  recommended.  3. Small right pleural effusion and left lung base atelectasis and/or  scarring.     Radiation dose reduction techniques were utilized, including automated  exposure control and exposure modulation based on body size.     This report was finalized on 12/17/2020 2:01 PM by Dr. Majo Gillis M.D.       CT Chest With Contrast [[id number]] Resulted: 12/17/20 0858     Updated: 12/17/20 0859    XR Knee 1 or 2 View Right  [[id number]] Collected: 12/14/20 1041     Updated: 12/14/20 1044    Narrative:      XR KNEE 1 OR 2 VW RIGHT-     POSTOP PORTABLE 2 VIEWS RIGHT KNEE     CLINICAL INFORMATION: Post arthroplasty     FINDINGS: Prosthesis is satisfactory in position. A complicating process  is not identified.     This report was finalized on 12/14/2020 10:41 AM by Dr. Jay Diaz M.D.       SCANNED - IMAGING [[id number]] Resulted: 12/14/20      Updated: 12/11/20 1053             I reviewed the patient's new clinical results.  I personally viewed and interpreted the patient's imaging results:        Medication Review:   aspirin, 325 mg, Oral, Daily  atorvastatin, 40 mg, Oral, Nightly  ferrous sulfate, 325 mg, Oral, Daily With Breakfast  glipizide, 5 mg, Oral, QAM AC  metFORMIN, 250 mg, Oral, Daily With Dinner  metFORMIN, 500 mg, Oral, Daily With Breakfast  metoprolol tartrate, 100 mg, Oral, Q12H  ramipril, 10 mg, Oral, Daily  sodium chloride, 3 mL, Intravenous, Q12H  terazosin, 5 mg, Oral, Nightly        lactated ringers, 9 mL/hr, Last Rate: 9 mL/hr (12/14/20 0707)  lactated ringers, 100 mL/hr, Last Rate: Stopped (12/14/20 2111)        ASSESSMENT:   1. Pulmonary nodule  2. History of tobacco abuse  3. Coronary artery disease    PLAN:  The nodule is definitely not vascular with no evidence of any contrast filling up the nodular space however it is in the very close proximity to a decent sized pulmonary artery vessel which make it a concern for any attempted biopsy.  Since the lesion is more than 1 cm in diameter, it can be further evaluated with a PET scan which can be arranged as an outpatient and then will consider further work-up accordingly.    We will arrange for the above and will follow up as needed for the remainder of the hospital stay otherwise we will see as an outpatient  Discussed with patient in details    Disposition: We will follow as an outpatient    Lalo Red MD  12/17/20  14:12 EST             Dictated utilizing  Flora dictation

## 2020-12-17 NOTE — PLAN OF CARE
Goal Outcome Evaluation:  Plan of Care Reviewed With: patient  Progress: improving  Outcome Summary: Pt seen for ADL with OT and bathroom xfers with OT today. Pt with improving mobility although slow moving.        Patient out of mask for bath during OT but worn for walk to BR.  Therapist used appropriate personal protective equipment including surgical mask, eye shield and gloves during the entire therapy session. Hand hygiene was completed before and after therapy session. Patient is not in enhanced droplet precautions.

## 2020-12-17 NOTE — PROGRESS NOTES
"  Orthopaedic Surgery   Daily Progress Note  Dr. Vicente Vick   [phone]  DEMOGRAPHICS:   · Demetrio Buckley   · Age:70 y.o.   · MRN:[id number]  · Admitted: 12/14/2020    PROCEDURE: 3 Days Post-Op s/p Procedure(s):  RIGHT TOTAL KNEE ARTHROPLASTY     /88 (BP Location: Right arm, Patient Position: Lying)   Pulse 87   Temp 98.2 °F (36.8 °C) (Skin)   Resp 18   Ht 182.9 cm (72.01\")   Wt 96.9 kg (213 lb 9 oz)   SpO2 95%   BMI 28.96 kg/m²     Lab Results (last 24 hours)     Procedure Component Value Units Date/Time    POC Glucose Once [[id number]]  (Abnormal) Collected: 12/17/20 0616    Specimen: Blood Updated: 12/17/20 0617     Glucose 162 mg/dL     POC Glucose Once [[id number]]  (Abnormal) Collected: 12/16/20 2119    Specimen: Blood Updated: 12/16/20 2120     Glucose 154 mg/dL     POC Glucose Once [[id number]]  (Abnormal) Collected: 12/16/20 1634    Specimen: Blood Updated: 12/16/20 1637     Glucose 147 mg/dL     POC Glucose Once [[id number]]  (Abnormal) Collected: 12/16/20 1124    Specimen: Blood Updated: 12/16/20 1132     Glucose 194 mg/dL           Imaging Results (Last 24 Hours)     ** No results found for the last 24 hours. **          Patient Care Team:  Matt Holloway MD as PCP - General (Internal Medicine)    SUBJECTIVE  Knee is doing well    PHYSICAL EXAM  Wound looks perfect.  Swelling is only mild.     ASSESSMENT: Patient is a 70 y.o. male who is 3 Days Post-Op s/p Procedure(s):  RIGHT TOTAL KNEE ARTHROPLASTY   1 RIGHT TOTAL KNEE ARTHROPLASTY correcting severe 11 degree varus deformity with 15 degree flexion contracture.  2.  Left knee end-stage primary osteoarthritis with severe 9 degree varus deformity  3.  Diabetes mellitus  4.  Hypertension  5.  Coronary artery disease  6.  Isolated calf DVT in 2004.  I do not believe he is high risk for DVT but we will monitor.  For now I will keep him on aspirin  7.  Preop chest x-ray showed a granuloma.    He is being worked up by pulmonary.  CT " scan was inconclusive.  He is getting a CT with contrast now/today  8.  Immobilization syndrome.  His wife informed me yesterday after surgery that he has really not been out of a chair for 2 months.  He has even been urinating in a jar in his chair.  9.  Small buttock pressure ulcer associated with his immobilization    PLAN / DISPOSITION:  CT scan of chest with contrast as per pulmonary  Mobilize  Probable discharge tomorrow    Vicente Vick Sr, MD  Orthopaedic Surgery  [facility]  [phone]

## 2020-12-17 NOTE — PLAN OF CARE
Goal Outcome Evaluation:  Plan of Care Reviewed With: patient  Progress: improving  Outcome Summary: Pt tolerated tx fair. He continues to require increased time for exercises and mobility. He progressed in ambulation distance to 100' and completed x2 stairs. Pt still has slower progress towards goal than anticipated and requires increased time for all mobility. He would benefit from rehab, but plan appears to be d/c home with HH and assist.    Patient was intermittently wearing a face mask during this therapy encounter. Therapist used appropriate personal protective equipment including eye protection, mask, and gloves.  Mask used was standard procedure mask. Appropriate PPE was worn during the entire therapy session. Hand hygiene was completed before and after therapy session. Patient is not in enhanced droplet precautions.

## 2020-12-18 ENCOUNTER — NURSE TRIAGE (OUTPATIENT)
Dept: CALL CENTER | Facility: HOSPITAL | Age: 70
End: 2020-12-18

## 2020-12-18 VITALS
RESPIRATION RATE: 18 BRPM | DIASTOLIC BLOOD PRESSURE: 78 MMHG | OXYGEN SATURATION: 96 % | HEIGHT: 72 IN | WEIGHT: 213.56 LBS | HEART RATE: 73 BPM | BODY MASS INDEX: 28.93 KG/M2 | TEMPERATURE: 97.3 F | SYSTOLIC BLOOD PRESSURE: 171 MMHG

## 2020-12-18 LAB
GLUCOSE BLDC GLUCOMTR-MCNC: 159 MG/DL (ref 70–130)
GLUCOSE BLDC GLUCOMTR-MCNC: 178 MG/DL (ref 70–130)

## 2020-12-18 PROCEDURE — A9270 NON-COVERED ITEM OR SERVICE: HCPCS | Performed by: ORTHOPAEDIC SURGERY

## 2020-12-18 PROCEDURE — 63710000001 GLIPIZIDE 5 MG TABLET: Performed by: PHYSICIAN ASSISTANT

## 2020-12-18 PROCEDURE — A9270 NON-COVERED ITEM OR SERVICE: HCPCS | Performed by: PHYSICIAN ASSISTANT

## 2020-12-18 PROCEDURE — 63710000001 FERROUS SULFATE 325 (65 FE) MG TABLET: Performed by: ORTHOPAEDIC SURGERY

## 2020-12-18 PROCEDURE — 97110 THERAPEUTIC EXERCISES: CPT

## 2020-12-18 PROCEDURE — 63710000001 METOPROLOL TARTRATE 50 MG TABLET: Performed by: PHYSICIAN ASSISTANT

## 2020-12-18 PROCEDURE — 97530 THERAPEUTIC ACTIVITIES: CPT

## 2020-12-18 PROCEDURE — G0378 HOSPITAL OBSERVATION PER HR: HCPCS

## 2020-12-18 PROCEDURE — 63710000001 RAMIPRIL 10 MG CAPSULE: Performed by: PHYSICIAN ASSISTANT

## 2020-12-18 PROCEDURE — 82962 GLUCOSE BLOOD TEST: CPT

## 2020-12-18 PROCEDURE — 63710000001 OXYCODONE-ACETAMINOPHEN 5-325 MG TABLET: Performed by: ORTHOPAEDIC SURGERY

## 2020-12-18 PROCEDURE — 63710000001 ASPIRIN EC 325 MG TABLET DELAYED-RELEASE: Performed by: ORTHOPAEDIC SURGERY

## 2020-12-18 RX ORDER — OXYCODONE HYDROCHLORIDE AND ACETAMINOPHEN 5; 325 MG/1; MG/1
1-2 TABLET ORAL EVERY 4 HOURS PRN
Qty: 60 TABLET | Refills: 0 | Status: SHIPPED | OUTPATIENT
Start: 2020-12-18 | End: 2020-12-21

## 2020-12-18 RX ORDER — ASPIRIN 325 MG
325 TABLET, DELAYED RELEASE (ENTERIC COATED) ORAL DAILY
Qty: 40 TABLET | Refills: 0 | Status: SHIPPED | OUTPATIENT
Start: 2020-12-18 | End: 2020-12-24 | Stop reason: HOSPADM

## 2020-12-18 RX ADMIN — ASPIRIN 325 MG: 325 TABLET, COATED ORAL at 08:46

## 2020-12-18 RX ADMIN — FERROUS SULFATE TAB 325 MG (65 MG ELEMENTAL FE) 325 MG: 325 (65 FE) TAB at 08:46

## 2020-12-18 RX ADMIN — RAMIPRIL 10 MG: 10 CAPSULE ORAL at 08:46

## 2020-12-18 RX ADMIN — METOPROLOL TARTRATE 100 MG: 50 TABLET, FILM COATED ORAL at 08:46

## 2020-12-18 RX ADMIN — OXYCODONE HYDROCHLORIDE AND ACETAMINOPHEN 2 TABLET: 5; 325 TABLET ORAL at 08:46

## 2020-12-18 RX ADMIN — SODIUM CHLORIDE, PRESERVATIVE FREE 3 ML: 5 INJECTION INTRAVENOUS at 08:48

## 2020-12-18 RX ADMIN — OXYCODONE HYDROCHLORIDE AND ACETAMINOPHEN 2 TABLET: 5; 325 TABLET ORAL at 04:30

## 2020-12-18 RX ADMIN — OXYCODONE HYDROCHLORIDE AND ACETAMINOPHEN 2 TABLET: 5; 325 TABLET ORAL at 00:34

## 2020-12-18 RX ADMIN — GLIPIZIDE 5 MG: 5 TABLET ORAL at 11:11

## 2020-12-18 RX ADMIN — OXYCODONE HYDROCHLORIDE AND ACETAMINOPHEN 2 TABLET: 5; 325 TABLET ORAL at 12:46

## 2020-12-18 NOTE — PROGRESS NOTES
"  Orthopaedic Surgery   Daily Progress Note  Dr. Vicente Vick Sr  [phone]  DEMOGRAPHICS:   · Demetrio Buckley   · Age:70 y.o.   · MRN:[id number]  · Admitted: 12/14/2020    PROCEDURE: 4 Days Post-Op s/p Procedure(s):  RIGHT TOTAL KNEE ARTHROPLASTY     /78 (BP Location: Right arm, Patient Position: Lying)   Pulse 73   Temp 97.3 °F (36.3 °C) (Skin)   Resp 18   Ht 182.9 cm (72.01\")   Wt 96.9 kg (213 lb 9 oz)   SpO2 96%   BMI 28.96 kg/m²     Lab Results (last 24 hours)     Procedure Component Value Units Date/Time    POC Glucose Once [[id number]]  (Abnormal) Collected: 12/18/20 0619    Specimen: Blood Updated: 12/18/20 0623     Glucose 159 mg/dL     POC Glucose Once [[id number]]  (Abnormal) Collected: 12/17/20 2153    Specimen: Blood Updated: 12/17/20 2155     Glucose 165 mg/dL     POC Glucose Once [[id number]]  (Abnormal) Collected: 12/17/20 1601    Specimen: Blood Updated: 12/17/20 1613     Glucose 187 mg/dL     POC Glucose Once [[id number]]  (Abnormal) Collected: 12/17/20 1043    Specimen: Blood Updated: 12/17/20 1044     Glucose 160 mg/dL           Imaging Results (Last 24 Hours)     Procedure Component Value Units Date/Time    CT Chest With Contrast [[id number]] Collected: 12/17/20 1554     Updated: 12/17/20 1559    Narrative:      CT CHEST W CONTRAST-     Radiation dose reduction techniques were utilized, including automated  exposure control and exposure modulation based on body size.     CLINICAL: Pulmonary nodule.     COMPARISON: 12/15/2020.     FINDINGS: Again demonstrated within the left lower lobe there is a  nodular density at the confluence of several vessels, the actual nodular  component measures 10 x 6 mm. The nodular area does not enhance similar  to the surrounding vascular structures. The tiny sclerotic nonexpansile  nodule is seen within the posterior right 6th rib is reidentified. There  are areas of linear atelectasis or scarring at the lung bases without  interval change. No new " nodule or area of active airspace disease has  developed within the interim. No hilar or mediastinal adenopathy. There  are median sternotomy wires in position, vascular markers in place.  Diameter of the aorta is within normal limits. There is cardiac  enlargement, no pericardial abnormality. The esophagus is satisfactory  in course and caliber.     Limited images through the upper abdomen demonstrate bilateral  nonobstructing renal calculi.     CONCLUSION: The nodule within the left lower lobe measures 10 x 6 mm and  is located at the confluence of several pulmonary veins, however, it  does not demonstrate enhancement pattern suggestive of vascular  malformation. Given its small size and location, I would recommend  short-term follow-up CT in 3-4 months.        This report was finalized on 12/17/2020 3:56 PM by Dr. Jay Diaz M.D.       CT Chest Without Contrast [[id number]] Collected: 12/15/20 1029     Updated: 12/17/20 1405    Narrative:      CT CHEST WITHOUT CONTRAST     HISTORY: Follow-up from abnormal chest radiograph demonstrating a small  nodule.     TECHNIQUE: Axial CT images of the chest were obtained without  administration of intravenous contrast. Coronal and sagittal reformats  were obtained.     COMPARISON: Chest radiograph dated 12/08/2020.     FINDINGS: The small nodular density seen projecting within the right  upper hemithorax corresponds to a sclerotic abnormality seen within the  posterolateral aspect of the right 6th rib, image 33 series 2. This is  favored to represent a bone island. Calcified coronary artery disease is  present. Changes from prior median sternotomy and CABG are seen. No  pericardial effusion is present. No pathological mediastinal  lymphadenopathy. Trace right pleural effusion is seen. The central  airways are patent without endobronchial lesion. Minimal secretions are  seen within the trachea. There are left basilar areas of scarring and/or  atelectasis. There is a  nodular density seen within the superior segment  of the left lower lobe measuring 1.1 x 1.0 cm.     The visualized upper abdomen demonstrates punctate partly imaged right  renal calculi. No pathological axillary lymphadenopathy.       Impression:      1. A nodular density seen on recent chest radiograph corresponds to a  sclerotic lesion within the posterolateral right 6th rib and is favored  to represent a bone island.  2. Nodular density measuring up to 1.1 cm within the superior segment of  the left lower lobe. This occurs at the confluence of numerous  bronchovascular structures; however, the morphology is suspicious and  further evaluation with CT chest with contrast for better  characterization and referral to multidisciplinary lung care clinic is  recommended.  3. Small right pleural effusion and left lung base atelectasis and/or  scarring.     Radiation dose reduction techniques were utilized, including automated  exposure control and exposure modulation based on body size.     This report was finalized on 12/17/2020 2:01 PM by Dr. Majo Gillis M.D.             Patient Care Team:  Matt Holloway MD as PCP - General (Internal Medicine)    SUBJECTIVE  More comfortable    PHYSICAL EXAM  Wound looks good     ASSESSMENT: Patient is a 70 y.o. male who is 4 Days Post-Op s/p Procedure(s):  RIGHT TOTAL KNEE ARTHROPLASTY   1 RIGHT TOTAL KNEE ARTHROPLASTY correcting severe 11 degree varus deformity with 15 degree flexion contracture.  2.  Left knee end-stage primary osteoarthritis with severe 9 degree varus deformity  3.  Diabetes mellitus  4.  Hypertension  5.  Coronary artery disease  6.  Isolated calf DVT in 2004.  I do not believe he is high risk for DVT but we will monitor.  For now I will keep him on aspirin  7.  CT scan of chest shows granuloma/mass  8.  Immobilization syndrome.  His wife informed me yesterday after surgery that he has really not been out of a chair for 2 months.  He has even been  urinating in a jar in his chair.  9.  Small buttock pressure ulcer associated with his immobilization  PLAN / DISPOSITION:  Aspirin for DVT prevention  Discharge today  Home therapy  Follow-up with pulmonary regarding CT findings.    Vicente Vick Sr, MD  Orthopaedic Surgery  [facility]  [phone]

## 2020-12-18 NOTE — PLAN OF CARE
Goal Outcome Evaluation:  Plan of Care Reviewed With: patient  Progress: improving  Outcome Summary: patient ambulating with assistance, pain controlled at this time, voiding without difficulty, educated on b/p and glucose monitoring

## 2020-12-18 NOTE — THERAPY TREATMENT NOTE
Patient Name: Demetrio Buckley  : 1950    MRN: [id number]                              Today's Date: 2020       Admit Date: 2020    Visit Dx:     ICD-10-CM ICD-9-CM   1. Pulmonary nodule  R91.1 793.11   2. Osteoarthritis of right knee, unspecified osteoarthritis type  M17.11 715.96     Patient Active Problem List   Diagnosis   • DJD (degenerative joint disease) of knee     Past Medical History:   Diagnosis Date   • Arthritis    • Atherosclerosis    • Coronary artery disease    • Diabetes mellitus (CMS/HCC)     TYPE 2   • Hyperlipidemia    • Hypertension    • Knee pain, right    • Limited joint range of motion     RIGHT KNEE   • Limited range of motion (ROM) of shoulder     RIGHT SHOULDER STIFF, GOING TO HAVE AN MRI ON SHOULDER 2020   • PAF (paroxysmal atrial fibrillation) (CMS/HCC)     FOLLOWS WITH  DR. LANDA   • PONV (postoperative nausea and vomiting)     IN THE PAST     Past Surgical History:   Procedure Laterality Date   • CATARACT EXTRACTION WITH INTRAOCULAR LENS IMPLANT Right    • CHOLECYSTECTOMY OPEN     • COLONOSCOPY     • CORONARY ARTERY BYPASS GRAFT      2 VESSELS   • KNEE ARTHROSCOPY Right     MENISCUS REPAIR   • TOTAL KNEE ARTHROPLASTY Right 2020    Procedure: RIGHT TOTAL KNEE ARTHROPLASTY;  Surgeon: Vicente Vick MD;  Location: [facility];  Service: Orthopedics;  Laterality: Right;   • WISDOM TOOTH EXTRACTION       General Information     Row Name 20 1106          Physical Therapy Time and Intention    Document Type  therapy note (daily note)  -CF     Mode of Treatment  physical therapy  -CF     Row Name 20 1106          General Information    Patient Profile Reviewed  yes  -CF     Existing Precautions/Restrictions  fall  -CF     Row Name 20 1106          Cognition    Orientation Status (Cognition)  oriented x 4  -CF     Row Name 20 1106          Safety Issues, Functional Mobility    Impairments Affecting Function  (Mobility)  pain;strength;endurance/activity tolerance;balance;range of motion (ROM)  -CF       User Key  (r) = Recorded By, (t) = Taken By, (c) = Cosigned By    Initials Name Provider Type    CF Mima Huitron PT Physical Therapist        Mobility     Row Name 12/18/20 1106          Bed Mobility    Bed Mobility  supine-sit  -CF     Supine-Sit Catron (Bed Mobility)  verbal cues;contact guard  -CF     Assistive Device (Bed Mobility)  head of bed elevated;bed rails  -CF     Row Name 12/18/20 1106          Sit-Stand Transfer    Sit-Stand Catron (Transfers)  verbal cues;contact guard;set up  -CF     Assistive Device (Sit-Stand Transfers)  walker, front-wheeled  -CF     Row Name 12/18/20 1106          Gait/Stairs (Locomotion)    Catron Level (Gait)  verbal cues;contact guard  -CF     Assistive Device (Gait)  walker, front-wheeled  -CF     Distance in Feet (Gait)  150'  -CF     Deviations/Abnormal Patterns (Gait)  ulises decreased;stride length decreased;right sided deviations;antalgic;festinating/shuffling;gait speed decreased  -CF     Bilateral Gait Deviations  forward flexed posture  -CF     Right Sided Gait Deviations  heel strike decreased;weight shift ability decreased  -CF     Comment (Gait/Stairs)  cues for R heel strike to promote terminal knee extension. Pt with complaint of wrist/hand pain as pt weightbearing through BUE a lot  -CF       User Key  (r) = Recorded By, (t) = Taken By, (c) = Cosigned By    Initials Name Provider Type    Mima Patel PT Physical Therapist        Obj/Interventions     Row Name 12/18/20 1108          Motor Skills    Therapeutic Exercise  other (see comments) tka exercises x15  -CF       User Key  (r) = Recorded By, (t) = Taken By, (c) = Cosigned By    Initials Name Provider Type    Mima Patel PT Physical Therapist        Goals/Plan    No documentation.       Clinical Impression     Row Name 12/18/20 1108          Pain    Additional  Documentation  Pain Scale: Numbers Pre/Post-Treatment (Group)  -CF     Row Name 12/18/20 1108          Pain Scale: Numbers Pre/Post-Treatment    Pretreatment Pain Rating  6/10  -CF     Posttreatment Pain Rating  6/10  -CF     Pain Location - Side  Right  -CF     Pain Location  knee  -CF     Pain Intervention(s)  Repositioned;Cold applied;Ambulation/increased activity;Rest  -CF     Row Name 12/18/20 1108          Plan of Care Review    Plan of Care Reviewed With  patient  -CF     Outcome Summary  Pt tolerated treatment well. Continues to require increased time for exercises and mobility but able to perform actively. Encouraged pt to get up every 2-3 hours at home to decrease stiffness and increase independence with mobillity. Pt OK to d/c home with assist and HH at this time.  -CF     Row Name 12/18/20 1108          Vital Signs    O2 Delivery Pre Treatment  room air  -CF     Row Name 12/18/20 1108          Positioning and Restraints    Pre-Treatment Position  in bed  -CF     Post Treatment Position  chair  -CF     In Chair  reclined;call light within reach;encouraged to call for assist;exit alarm on;RLE elevated  -CF       User Key  (r) = Recorded By, (t) = Taken By, (c) = Cosigned By    Initials Name Provider Type    CF Mima Huitron, PT Physical Therapist        Outcome Measures     Row Name 12/18/20 1111          How much help from another person do you currently need...    Turning from your back to your side while in flat bed without using bedrails?  3  -CF     Moving from lying on back to sitting on the side of a flat bed without bedrails?  3  -CF     Moving to and from a bed to a chair (including a wheelchair)?  3  -CF     Standing up from a chair using your arms (e.g., wheelchair, bedside chair)?  3  -CF     Climbing 3-5 steps with a railing?  3  -CF     To walk in hospital room?  3  -CF     AM-PAC 6 Clicks Score (PT)  18  -CF     Row Name 12/18/20 1111          Functional Assessment    Outcome Measure  Options  AM-PAC 6 Clicks Basic Mobility (PT)  -CF       User Key  (r) = Recorded By, (t) = Taken By, (c) = Cosigned By    Initials Name Provider Type     Mima Huitron PT Physical Therapist        Physical Therapy Education                 Title: PT OT SLP Therapies (Done)     Topic: Physical Therapy (Done)     Point: Mobility training (Done)     Learning Progress Summary           Patient Acceptance, E, VU,NR by CF at 12/18/2020 1111    Acceptance, E, VU,NR by CF at 12/17/2020 1256    Acceptance, E, VU by  at 12/16/2020 1032    Acceptance, E,TB, VU,DU by ST at 12/15/2020 1617    Acceptance, E, VU,NR by  at 12/15/2020 1350    Acceptance, E,TB, VU,NR by  at 12/14/2020 1517                   Point: Home exercise program (Done)     Learning Progress Summary           Patient Acceptance, E, VU,NR by CF at 12/18/2020 1111    Acceptance, E, VU,NR by CF at 12/17/2020 1256    Acceptance, E, VU by  at 12/16/2020 1032    Acceptance, E,TB, VU,DU by  at 12/15/2020 1617    Acceptance, E, VU,NR by  at 12/15/2020 1350    Acceptance, E,TB, VU,NR by  at 12/14/2020 1517                   Point: Body mechanics (Done)     Learning Progress Summary           Patient Acceptance, E, VU,NR by CF at 12/18/2020 1111    Acceptance, E, VU,NR by  at 12/17/2020 1256    Acceptance, E, VU by  at 12/16/2020 1032    Acceptance, E,TB, VU,DU by ST at 12/15/2020 1617    Acceptance, E, VU,NR by  at 12/15/2020 1350    Acceptance, E,TB, VU,NR by  at 12/14/2020 1517                   Point: Precautions (Done)     Learning Progress Summary           Patient Acceptance, E, VU,NR by CF at 12/18/2020 1111    Acceptance, E, VU,NR by  at 12/17/2020 1256    Acceptance, E, VU by CF at 12/16/2020 1032    Acceptance, E,TB, VU,DU by ST at 12/15/2020 1617    Acceptance, E, VU,NR by  at 12/15/2020 1350                               User Key     Initials Effective Dates Name Provider Type Discipline    EE 04/03/18 -  Bernadette Genao, PT  Physical Therapist PT    ST 10/13/17 -  Tonja Reynolds, RN Registered Nurse Nurse     09/02/20 -  Mima Huitron PT Physical Therapist PT              PT Recommendation and Plan     Plan of Care Reviewed With: patient  Outcome Summary: Pt tolerated treatment well. Continues to require increased time for exercises and mobility but able to perform actively. Encouraged pt to get up every 2-3 hours at home to decrease stiffness and increase independence with mobillity. Pt OK to d/c home with assist and HH at this time.     Time Calculation:   PT Charges     Row Name 12/18/20 1150             Time Calculation    Start Time  1102  -CF      Stop Time  1143  -CF      Time Calculation (min)  41 min  -CF      PT Received On  12/18/20  -CF      PT - Next Appointment  12/19/20  -CF        User Key  (r) = Recorded By, (t) = Taken By, (c) = Cosigned By    Initials Name Provider Type    CF Mima Huitron, SHANA Physical Therapist        Therapy Charges for Today     Code Description Service Date Service Provider Modifiers Qty    81690629174 HC PT THER PROC EA 15 MIN 12/17/2020 Mima Huitron, PT GP 1    61491778613 HC PT THERAPEUTIC ACT EA 15 MIN 12/17/2020 Mima Huitron, PT GP 1    97004856033 HC PT THER PROC EA 15 MIN 12/18/2020 Mima Huitron, PT GP 2    54901069483 HC PT THERAPEUTIC ACT EA 15 MIN 12/18/2020 Mima Huitron, PT GP 1          PT G-Codes  Outcome Measure Options: AM-PAC 6 Clicks Basic Mobility (PT)  AM-PAC 6 Clicks Score (PT): 18  AM-PAC 6 Clicks Score (OT): 17    Mima Huitron PT  12/18/2020

## 2020-12-18 NOTE — PROGRESS NOTES
Continued Stay Note  [facility]     Patient Name: Demetrio Buckley  MRN: [id number]  Today's Date: 12/18/2020    Admit Date: 12/14/2020    Discharge Plan     Row Name 12/18/20 1043       Plan    Final Discharge Disposition Code  06 - home with home health care    Final Note  Pt to d/c home with [facility] , notified Claudia with [facility] of d/c orders.        Discharge Codes    No documentation.       Expected Discharge Date and Time     Expected Discharge Date Expected Discharge Time    Dec 18, 2020             Jen Cruz RN

## 2020-12-18 NOTE — PLAN OF CARE
Goal Outcome Evaluation:  Plan of Care Reviewed With: patient  Progress: improving  Outcome Summary: Pt tolerated treatment well. Continues to require increased time for exercises and mobility but able to perform actively. Encouraged pt to get up every 2-3 hours at home to decrease stiffness and increase independence with mobillity. Pt OK to d/c home with assist and HH at this time.    Patient was intermittently wearing a face mask during this therapy encounter. Therapist used appropriate personal protective equipment including eye protection, mask, and gloves.  Mask used was standard procedure mask. Appropriate PPE was worn during the entire therapy session. Hand hygiene was completed before and after therapy session. Patient is not in enhanced droplet precautions.

## 2020-12-18 NOTE — DISCHARGE SUMMARY
Discharge Summary   Vicente Vick M.D.    NAME: Demetrio Buckley ADMIT: 2020   : 1950  PCP: Matt Holloway MD    MRN: [id number] LOS: 0 days   AGE/SEX: 70 y.o. male  ROOM: P796/1       Date of Discharge: 2020    Primary Discharge Diagnosis:  DJD (degenerative joint disease) of knee [M17.10]  DJD (degenerative joint disease) of knee [M17.10]  Osteoarthritis of right knee, unspecified osteoarthritis type [M17.11]    Secondary Discharge Diagnosis:    Problems Addressed this Visit        Musculoskeletal and Integument    DJD (degenerative joint disease) of knee    Relevant Medications    oxyCODONE-acetaminophen (PERCOCET) 5-325 MG per tablet    Other Relevant Orders    Commode Chair      Other Visit Diagnoses     Pulmonary nodule    -  Primary    Relevant Orders    NM Pet Skull Base To Mid Thigh      Diagnoses       Codes Comments    Pulmonary nodule    -  Primary ICD-10-CM: R91.1  ICD-9-CM: 793.11     Osteoarthritis of right knee, unspecified osteoarthritis type     ICD-10-CM: M17.11  ICD-9-CM: 715.96           Procedures Performed:  Right Total Knee Arthroplasty    Hospital Course:    Demetrio Buckley is a 70 y.o.  male who underwent successful Right Total Knee Arthroplasty on 2020.  Demetrio Buckley was started on Aspirin 325mg po daily immediately post-operatively for DVT prophylaxis.  On post-op day 1 the patients dressing was changed, drain removed and their incision was clean, with no signs of infection and their calf was soft, with no signs of DVT.  The patient progressed slowly with physical therapy and the patients hemoglobin remained stable.  His discharge was delayed due to his immobilization syndrome and his slow progression with therapy and mobilization.  Also he had a CT scan performed which confirmed a nodule on preop chest x-ray.  Pulmonary team was consulted.  A CT with contrast was performed.  Pulmonary is following up on this.  On post-operative day 4  the patient was felt ready for discharge.      Total Knee Joint Replacement Discharge Instructions:    I. ACTIVITIES:    1. Exercises:  ? Complete exercise program as taught by the hospital physical therapist 2 times per day  ? Exercise program will be advanced by the physical therapist  ? During the day be up ambulating every 2 hours (while awake) for short distances  ? Complete the ankle pump exercises at least 10 times per hour (while awake)  ? Elevate legs most of the day the first week post operatively and thereafter elevate legs when in bed and for at least 30 minutes during the day. Caution must be taken to avoid pillow placement under the bend of the knee as this can led to flexion contractures of the knee.  ? Use cold packs 20-30 minutes approximately 5 times per day. This should be done before and after completing your exercises and at any time you are experiencing pain/ stiffness in your operative extremity.    2. Activities of Daily Living:  ? No tub baths, hot tubs, or swimming pools for 4 weeks  ? May shower and let water run over the incision on post-operative day #7 if no drainage. Do not scrub or rub the incision. Simply let the water run over the incision and pat dry.    II. Precautions:    ? Everyone that comes near you should wash their hands  ? No elective dental, genital-urinary, or colon procedures or surgical procedures for 12 weeks after surgery unless absolutely necessary.  ?  If dental work or surgical procedure is deemed absolutely necessary during the first 12 weeks, you will need to contact your surgeon as you will need to take antibiotics 1 hour prior to any dental work (including teeth cleanings).  ? Please discuss with your surgeon prophylactic antibiotics as the length of time this intervention will be necessary for you varies with each patient’s health history and situation.  ? Avoid sick people. If you must be around someone who is ill, they should wear a mask.  ? Avoid visits to  the Emergency Room or Urgent Care unless you are having a life threatening event.     III. INCISION CARE:    ? Wash your hands prior to dressing changes  ? Change the dressing as needed to keep incision clean and dry. Utilize dry gauze and paper tape. Avoid touching the side of the gauze that goes against the incision with your hands.  ? No creams or ointments to the incision  ? May remove dressing once the incision is free of drainage  ? Do not touch or pick at the incision  ? Check incision every day and notify surgeon immediately if any of the following signs or symptoms are noted:  o Increase in redness  o Increase in swelling around the incision and of the entire extremity  o Increase in pain  o Drainage oozing from the incision  o Pulling apart of the edges of the incision  o Increase in overall body temperature (greater than 100.5 degrees)  ? Your surgeon will instruct you regarding suture or staple removal    IV. Medications:     1. Anticoagulants: You will be discharged on an anticoagulant. This is a prophylactic medication that helps prevent blood clots during your post-operative period. The type and length of dosage varies based on your individual needs, procedure performed, and surgeon’s preference.    ? While taking the anticoagulant, you should avoid taking any additional aspirin, ibuprofen (Advil or Motrin), Aleve (Naprosyn) or other non-steroidal anti-inflammatory medications.   ? Notify surgeon immediately if any gallo bleeding is noted in the urine, stool, emesis, or from the nose or the incision. Blood in the stool will often appear as black rather than red. Blood in urine may appear as pink. Blood in emesis may appear as brown/black like coffee grounds.  ? You will need to apply pressure for longer periods of time to any cuts or abrasions to stop bleeding  ? Avoid alcohol while taking anticoagulants    2. Stool Softeners: You will be at greater risk of constipation after surgery due to being less  mobile and the pain medications.   ? Take stool softeners as instructed by your surgeon while on pain medications. Bran cereal is most effective. Over the counter Colace 100 mg 1-2 capsules twice daily.   ? Drink plenty of fluids, and eat fruits and vegetables during your recovery time    3. Pain Medications utilized after surgery are narcotics and the law requires that the following information be given to all patients that are prescribed narcotics:  ? CLASSIFICATION: Pain medications are called Opioids and are narcotics  ? LEGALITIES: It is illegal to share narcotics with others and to drive within 24 hours of taking narcotics  ? POTENTIAL SIDE EFFECTS: Potential side effects of opioids include: nausea, vomiting, itching, dizziness, drowsiness, dry mouth, constipation, and difficulty urinating.  ? POTENTIAL ADVERSE EFFECTS:   o Opioid tolerance can develop with use of pain medications and this simply means that it requires more and more of the medication to control pain; however, this is seen more in patients that use opioids for longer periods of time.  o Opioid dependence can develop with use of Opioids and this simply means that to stop the medication can cause withdrawal symptoms; however, this is seen with patients that use Opioids for longer periods of time.  o Opioid addiction can develop with use of Opioids and the incidence of this is very unlikely in patients who take the medications as ordered and stop the medications as instructed.  o Opioid overdose can be dangerous, but is unlikely when the medication is taken as ordered and stopped when ordered. It is important not to mix opioids with alcohol or with and type of sedative such as Benadryl as this can lead to over sedation and respiratory difficulty.  ? DOSAGE:   o Pain medications will need to be taken consistently for the first week to decrease pain and promote adequate pain relief and participation in physical therapy.  o After the initial surgical  pain begins to resolve, you may begin to decrease the pain medication. By the end of 6-8 weeks, you should be off of pain medications.  o Refills will not be given by the office during evening hours, on weekends, or after 6-8 weeks post-op.  o To seek refills on pain medications during the initial 6 week post-operative period, you must call the office 48 hours in advance to request the refill. The office will then notify you when to  the prescription. DO NOT wait until you are out of the medication to request a refill.    V. FOLLOW-UP VISITS:  ? You will need to follow up in the office with your surgeon in 3 weeks. Please call this number [phone] to schedule this appointment.  If you have any concerns or suspected complications prior to your follow up visit, please call your surgeons office. Do not wait until your appointment time if you suspect complications. These will need to be addressed in the office promptly.    Final diagnosis:  1 RIGHT TOTAL KNEE ARTHROPLASTY correcting severe 11 degree varus deformity with 15 degree flexion contracture.  2.  Left knee end-stage primary osteoarthritis with severe 9 degree varus deformity  3.  Diabetes mellitus  4.  Hypertension  5.  Coronary artery disease  6.  Isolated calf DVT in 2004.  I do not believe he is high risk for DVT but we will monitor.  For now I will keep him on aspirin  7.  Preop chest x-ray showed a granuloma.    He is being worked up by pulmonary.    Contrast CT confirms nodule/mass on his lung.  8.  Immobilization syndrome.  His wife informed me yesterday after surgery that he has really not been out of a chair for 2 months.  He has even been urinating in a jar in his chair.  9.  Small buttock pressure ulcer associated with his immobilization    Discharge Medications:     1) Percocet 5/325 1-2 po q 4-6 hours for pain control  2)  Aspirin 325 mg po daily for 6 weeks.    He will follow-up with pulmonary regarding the CT of chest  finding.      Vicente Vick MD  12/18/2020  07:52 EST

## 2020-12-18 NOTE — PLAN OF CARE
Goal Outcome Evaluation:  Plan of Care Reviewed With: patient  Progress: improving  Outcome Summary: pain controlled much better today with PO, ambulating assist of 1, VSS, NVI, dressing c/d/i, plan to dc home tomorrow, educated on glucose meds and monitoring

## 2020-12-19 NOTE — TELEPHONE ENCOUNTER
Wanting instructions on after care, non found in AVS, read from  notesTotal Knee Joint Replacement Discharge Instructions:     I. ACTIVITIES:     1. Exercises:  · Complete exercise program as taught by the hospital physical therapist 2 times per day  · Exercise program will be advanced by the physical therapist  · During the day be up ambulating every 2 hours (while awake) for short distances  · Complete the ankle pump exercises at least 10 times per hour (while awake)  · Elevate legs most of the day the first week post operatively and thereafter elevate legs when in bed and for at least 30 minutes during the day. Caution must be taken to avoid pillow placement under the bend of the knee as this can led to flexion contractures of the knee.  · Use cold packs 20-30 minutes approximately 5 times per day. This should be done before and after completing your exercises and at any time you are experiencing pain/ stiffness in your operative extremity.     2. Activities of Daily Living:  · No tub baths, hot tubs, or swimming pools for 4 weeks  May shower and let water run over the incision on post-operative day #7 if no drainage. Do not scrub or rub the incision. Simply let the water run over the incision and pat dry.  III. INCISION CARE:     · Wash your hands prior to dressing changes  · Change the dressing as needed to keep incision clean and dry. Utilize dry gauze and paper tape. Avoid touching the side of the gauze that goes against the incision with your hands.  · No creams or ointments to the incision  · May remove dressing once the incision is free of drainage  · Do not touch or pick at the incision  · Check incision every day and notify surgeon immediately if any of the following signs or symptoms are noted:  ? Increase in redness  ? Increase in swelling around the incision and of the entire extremity  ? Increase in pain  ? Drainage oozing from the incision  ? Pulling apart of the edges of the  "incision  ? Increase in overall body temperature (greater than 100.5 degrees)  · Your surgeon will instruct you regarding suture or staple removal  Reason for Disposition  • Health Information question, no triage required and triager able to answer question    Additional Information  • Negative: [1] Caller is not with the adult (patient) AND [2] reporting urgent symptoms  • Negative: Lab result questions  • Negative: Medication questions  • Negative: Caller can't be reached by phone  • Negative: Caller has already spoken to PCP or another triager  • Negative: RN needs further essential information from caller in order to complete triage  • Negative: Requesting regular office appointment  • Negative: [1] Caller requesting NON-URGENT health information AND [2] PCP's office is the best resource    Answer Assessment - Initial Assessment Questions  1. REASON FOR CALL or QUESTION: \"What is your reason for calling today?\" or \"How can I best help you?\" or \"What question do you have that I can help answer?\"      Needing information about care of patient after surgery    Protocols used: INFORMATION ONLY CALL - NO TRIAGE-ADULT-      "

## 2020-12-23 ENCOUNTER — HOSPITAL ENCOUNTER (OUTPATIENT)
Facility: HOSPITAL | Age: 70
Setting detail: OBSERVATION
Discharge: HOME | End: 2020-12-24
Attending: ORTHOPAEDIC SURGERY | Admitting: HOSPITALIST

## 2020-12-23 DIAGNOSIS — I82.401 ACUTE DEEP VEIN THROMBOSIS (DVT) OF RIGHT LOWER EXTREMITY, UNSPECIFIED VEIN (HCC): Primary | ICD-10-CM

## 2020-12-23 PROBLEM — I82.409 DVT (DEEP VENOUS THROMBOSIS) (HCC): Status: ACTIVE | Noted: 2020-12-23

## 2020-12-23 LAB
APTT PPP: 26.9 SECONDS (ref 22.7–35.4)
BASOPHILS # BLD AUTO: 0.02 10*3/MM3 (ref 0–0.2)
BASOPHILS NFR BLD AUTO: 0.2 % (ref 0–1.5)
CRP SERPL-MCNC: 1.67 MG/DL (ref 0–0.5)
DEPRECATED RDW RBC AUTO: 38.5 FL (ref 37–54)
EOSINOPHIL # BLD AUTO: 0.07 10*3/MM3 (ref 0–0.4)
EOSINOPHIL NFR BLD AUTO: 0.7 % (ref 0.3–6.2)
ERYTHROCYTE [DISTWIDTH] IN BLOOD BY AUTOMATED COUNT: 11.5 % (ref 12.3–15.4)
ERYTHROCYTE [SEDIMENTATION RATE] IN BLOOD: 43 MM/HR (ref 0–20)
GLUCOSE BLDC GLUCOMTR-MCNC: 217 MG/DL (ref 70–130)
HCT VFR BLD AUTO: 29.9 % (ref 37.5–51)
HGB BLD-MCNC: 9.8 G/DL (ref 13–17.7)
IMM GRANULOCYTES # BLD AUTO: 0.15 10*3/MM3 (ref 0–0.05)
IMM GRANULOCYTES NFR BLD AUTO: 1.5 % (ref 0–0.5)
INR PPP: 1.28 (ref 0.9–1.1)
LYMPHOCYTES # BLD AUTO: 1.09 10*3/MM3 (ref 0.7–3.1)
LYMPHOCYTES NFR BLD AUTO: 11.1 % (ref 19.6–45.3)
MCH RBC QN AUTO: 30.1 PG (ref 26.6–33)
MCHC RBC AUTO-ENTMCNC: 32.8 G/DL (ref 31.5–35.7)
MCV RBC AUTO: 91.7 FL (ref 79–97)
MONOCYTES # BLD AUTO: 0.86 10*3/MM3 (ref 0.1–0.9)
MONOCYTES NFR BLD AUTO: 8.8 % (ref 5–12)
NEUTROPHILS NFR BLD AUTO: 7.59 10*3/MM3 (ref 1.7–7)
NEUTROPHILS NFR BLD AUTO: 77.7 % (ref 42.7–76)
NRBC BLD AUTO-RTO: 0 /100 WBC (ref 0–0.2)
NT-PROBNP SERPL-MCNC: 1418 PG/ML (ref 0–900)
PLATELET # BLD AUTO: 357 10*3/MM3 (ref 140–450)
PMV BLD AUTO: 9.5 FL (ref 6–12)
PROTHROMBIN TIME: 15.8 SECONDS (ref 11.7–14.2)
RBC # BLD AUTO: 3.26 10*6/MM3 (ref 4.14–5.8)
SARS-COV-2 ORF1AB RESP QL NAA+PROBE: NOT DETECTED
WBC # BLD AUTO: 9.78 10*3/MM3 (ref 3.4–10.8)

## 2020-12-23 PROCEDURE — G0379 DIRECT REFER HOSPITAL OBSERV: HCPCS

## 2020-12-23 PROCEDURE — 83880 ASSAY OF NATRIURETIC PEPTIDE: CPT | Performed by: ORTHOPAEDIC SURGERY

## 2020-12-23 PROCEDURE — 85025 COMPLETE CBC W/AUTO DIFF WBC: CPT | Performed by: ORTHOPAEDIC SURGERY

## 2020-12-23 PROCEDURE — 25010000002 ENOXAPARIN PER 10 MG: Performed by: INTERNAL MEDICINE

## 2020-12-23 PROCEDURE — 63710000001 INSULIN LISPRO (HUMAN) PER 5 UNITS: Performed by: INTERNAL MEDICINE

## 2020-12-23 PROCEDURE — 85730 THROMBOPLASTIN TIME PARTIAL: CPT | Performed by: ORTHOPAEDIC SURGERY

## 2020-12-23 PROCEDURE — G0378 HOSPITAL OBSERVATION PER HR: HCPCS

## 2020-12-23 PROCEDURE — U0004 COV-19 TEST NON-CDC HGH THRU: HCPCS | Performed by: ORTHOPAEDIC SURGERY

## 2020-12-23 PROCEDURE — 96372 THER/PROPH/DIAG INJ SC/IM: CPT

## 2020-12-23 PROCEDURE — 82962 GLUCOSE BLOOD TEST: CPT

## 2020-12-23 PROCEDURE — 86140 C-REACTIVE PROTEIN: CPT | Performed by: ORTHOPAEDIC SURGERY

## 2020-12-23 PROCEDURE — C9803 HOPD COVID-19 SPEC COLLECT: HCPCS

## 2020-12-23 PROCEDURE — 85610 PROTHROMBIN TIME: CPT | Performed by: ORTHOPAEDIC SURGERY

## 2020-12-23 PROCEDURE — 85652 RBC SED RATE AUTOMATED: CPT | Performed by: ORTHOPAEDIC SURGERY

## 2020-12-23 RX ORDER — OXYCODONE HYDROCHLORIDE AND ACETAMINOPHEN 5; 325 MG/1; MG/1
1 TABLET ORAL EVERY 4 HOURS PRN
Status: DISCONTINUED | OUTPATIENT
Start: 2020-12-23 | End: 2020-12-23

## 2020-12-23 RX ORDER — ONDANSETRON 2 MG/ML
4 INJECTION INTRAMUSCULAR; INTRAVENOUS EVERY 6 HOURS PRN
Status: DISCONTINUED | OUTPATIENT
Start: 2020-12-23 | End: 2020-12-24 | Stop reason: HOSPADM

## 2020-12-23 RX ORDER — TRAMADOL HYDROCHLORIDE 50 MG/1
50 TABLET ORAL EVERY 6 HOURS PRN
Status: DISCONTINUED | OUTPATIENT
Start: 2020-12-23 | End: 2020-12-24 | Stop reason: HOSPADM

## 2020-12-23 RX ORDER — ACETAMINOPHEN 325 MG/1
650 TABLET ORAL EVERY 4 HOURS PRN
Status: DISCONTINUED | OUTPATIENT
Start: 2020-12-23 | End: 2020-12-24 | Stop reason: HOSPADM

## 2020-12-23 RX ORDER — ACETAMINOPHEN 160 MG/5ML
650 SOLUTION ORAL EVERY 4 HOURS PRN
Status: DISCONTINUED | OUTPATIENT
Start: 2020-12-23 | End: 2020-12-24 | Stop reason: HOSPADM

## 2020-12-23 RX ORDER — NICOTINE POLACRILEX 4 MG
15 LOZENGE BUCCAL
Status: DISCONTINUED | OUTPATIENT
Start: 2020-12-23 | End: 2020-12-24 | Stop reason: HOSPADM

## 2020-12-23 RX ORDER — SODIUM CHLORIDE 0.9 % (FLUSH) 0.9 %
10 SYRINGE (ML) INJECTION EVERY 12 HOURS SCHEDULED
Status: DISCONTINUED | OUTPATIENT
Start: 2020-12-23 | End: 2020-12-24 | Stop reason: HOSPADM

## 2020-12-23 RX ORDER — OXYCODONE HYDROCHLORIDE AND ACETAMINOPHEN 5; 325 MG/1; MG/1
2 TABLET ORAL EVERY 4 HOURS PRN
Status: DISCONTINUED | OUTPATIENT
Start: 2020-12-23 | End: 2020-12-23

## 2020-12-23 RX ORDER — SODIUM CHLORIDE 0.9 % (FLUSH) 0.9 %
10 SYRINGE (ML) INJECTION AS NEEDED
Status: DISCONTINUED | OUTPATIENT
Start: 2020-12-23 | End: 2020-12-24 | Stop reason: HOSPADM

## 2020-12-23 RX ORDER — HYDROCODONE BITARTRATE AND ACETAMINOPHEN 7.5; 325 MG/1; MG/1
2 TABLET ORAL EVERY 4 HOURS PRN
Status: DISCONTINUED | OUTPATIENT
Start: 2020-12-23 | End: 2020-12-24 | Stop reason: HOSPADM

## 2020-12-23 RX ORDER — ATORVASTATIN CALCIUM 20 MG/1
40 TABLET, FILM COATED ORAL NIGHTLY
Status: DISCONTINUED | OUTPATIENT
Start: 2020-12-23 | End: 2020-12-24 | Stop reason: HOSPADM

## 2020-12-23 RX ORDER — ACETAMINOPHEN 325 MG/1
650 TABLET ORAL EVERY 6 HOURS PRN
Status: DISCONTINUED | OUTPATIENT
Start: 2020-12-23 | End: 2020-12-23

## 2020-12-23 RX ORDER — INSULIN LISPRO 100 [IU]/ML
0-9 INJECTION, SOLUTION INTRAVENOUS; SUBCUTANEOUS
Status: DISCONTINUED | OUTPATIENT
Start: 2020-12-23 | End: 2020-12-24 | Stop reason: HOSPADM

## 2020-12-23 RX ORDER — DEXTROSE MONOHYDRATE 25 G/50ML
25 INJECTION, SOLUTION INTRAVENOUS
Status: DISCONTINUED | OUTPATIENT
Start: 2020-12-23 | End: 2020-12-24 | Stop reason: HOSPADM

## 2020-12-23 RX ORDER — ACETAMINOPHEN 650 MG/1
650 SUPPOSITORY RECTAL EVERY 4 HOURS PRN
Status: DISCONTINUED | OUTPATIENT
Start: 2020-12-23 | End: 2020-12-24 | Stop reason: HOSPADM

## 2020-12-23 RX ORDER — TERAZOSIN 5 MG/1
5 CAPSULE ORAL NIGHTLY
Status: DISCONTINUED | OUTPATIENT
Start: 2020-12-23 | End: 2020-12-24 | Stop reason: HOSPADM

## 2020-12-23 RX ADMIN — ENOXAPARIN SODIUM 90 MG: 100 INJECTION SUBCUTANEOUS at 21:58

## 2020-12-23 RX ADMIN — INSULIN LISPRO 4 UNITS: 100 INJECTION, SOLUTION INTRAVENOUS; SUBCUTANEOUS at 21:18

## 2020-12-23 RX ADMIN — TERAZOSIN HYDROCHLORIDE 5 MG: 5 CAPSULE ORAL at 21:18

## 2020-12-23 RX ADMIN — ATORVASTATIN CALCIUM 40 MG: 20 TABLET, FILM COATED ORAL at 21:18

## 2020-12-23 RX ADMIN — SODIUM CHLORIDE, PRESERVATIVE FREE 10 ML: 5 INJECTION INTRAVENOUS at 21:20

## 2020-12-23 NOTE — PLAN OF CARE
Goal Outcome Evaluation:  Plan of Care Reviewed With: patient  Progress: no change  Outcome Summary: DA today after MRI with DVT in RLE. RTKA on 12/14/20. vss. dsg cdi. ambulates well with x1 assist and walker. voiding per BRP. denies pain at this time. Dr Vick to see patient later this evening. [facility] conulted for medical management. educated pt on bp monitoring.

## 2020-12-23 NOTE — H&P
.......    Orthopaedic Surgery  History & Physical  Vicente Vick Sr, MD  [phone]    HPI:  Patient is a 70 y.o. male who underwent right total knee replacement December 14, 2020.  His biggest comorbidity facing the surgery was immobilization syndrome.  According to his wife for 2 months prior to surgery he virtually never got out of his chair.  She even had to bring him a bottle to urinate in.  Postoperatively we noted that he had a small decubitus on his buttock confirming his lack of mobility.  He was slow to mobilize postop.  Eventually though he was mobile enough for home discharge with home health.  During his hospital stay he had a CT of his chest which does show a nodule that is yet to be diagnosed.  Metastatic lesion is possible.  In addition, he does have a history of DVT in 2004.  He has never had one since.  We did not think he was a particularly high risk and he was discharged on aspirin.  He called and complained of pain in his calf and we sent him for a Doppler study.  It is reported that the Doppler study showed a femoral vein clot.  He was admitted for anticoagulation    MEDICAL HISTORY  Past Medical History:   Diagnosis Date   • Arthritis    • Atherosclerosis    • Coronary artery disease    • Diabetes mellitus (CMS/HCC)     TYPE 2   • Hyperlipidemia    • Hypertension    • Knee pain, right    • Limited joint range of motion     RIGHT KNEE   • Limited range of motion (ROM) of shoulder     RIGHT SHOULDER STIFF, GOING TO HAVE AN MRI ON SHOULDER 12-8-2020   • PAF (paroxysmal atrial fibrillation) (CMS/HCC)     FOLLOWS WITH  DR. LANDA   • PONV (postoperative nausea and vomiting)     IN THE PAST   ·   Past Surgical History:   Procedure Laterality Date   • CATARACT EXTRACTION WITH INTRAOCULAR LENS IMPLANT Right    • CHOLECYSTECTOMY OPEN  1980'S   • COLONOSCOPY  2010   • CORONARY ARTERY BYPASS GRAFT  2004    2 VESSELS   • KNEE ARTHROSCOPY Right 2002    MENISCUS REPAIR   • TOTAL KNEE ARTHROPLASTY Right  2020    Procedure: RIGHT TOTAL KNEE ARTHROPLASTY;  Surgeon: Vicente Vick MD;  Location: [facility];  Service: Orthopedics;  Laterality: Right;   • WISDOM TOOTH EXTRACTION     ·   Prior to Admission medications    Medication Sig Start Date End Date Taking? Authorizing Provider   acetaminophen (TYLENOL) 500 MG tablet Take 1,000 mg by mouth Every 6 (Six) Hours As Needed for Mild Pain .   Yes Sharon Dutton MD   aspirin  MG tablet Take 1 tablet by mouth Daily for 42 days. 20 Yes Vicente Vick MD   atorvastatin (LIPITOR) 40 MG tablet Take 40 mg by mouth Every Night.   Yes Sharon Dutton MD   glimepiride (AMARYL) 2 MG tablet Take 2 mg by mouth Every Morning Before Breakfast.   Yes Sharon Dutton MD   hydroCHLOROthiazide (HYDRODIURIL) 25 MG tablet Take 25 mg by mouth As Needed.   Yes Sharon Dutton MD   meloxicam (MOBIC) 7.5 MG tablet Take 7.5 mg by mouth Daily.   Yes Sharon Dutton MD   metFORMIN (GLUCOPHAGE) 250 MG half tablet Take 250 mg by mouth Daily With Dinner.   Yes Sharon Dutton MD   metFORMIN (GLUCOPHAGE) 500 MG tablet Take 500 mg by mouth Daily With Breakfast.   Yes Sharon Dutton MD   metoprolol tartrate (LOPRESSOR) 100 MG tablet Take 100 mg by mouth 2 (Two) Times a Day.   Yes Sharon Dutton MD   ramipril (ALTACE) 10 MG capsule Take 10 mg by mouth Daily.   Yes Sharon Dutton MD   terazosin (HYTRIN) 5 MG capsule Take 5 mg by mouth Every Night.   Yes Sharon Dutton MD   ·   · No Known Allergies  ·   · There is no immunization history on file for this patient.  Social History     Tobacco Use   • Smoking status: Former Smoker     Packs/day: 0.75     Years: 14.00     Pack years: 10.50     Types: Cigarettes     Quit date:      Years since quittin.0   • Smokeless tobacco: Never Used   Substance Use Topics   • Alcohol use: Yes     Alcohol/week: 7.0 standard drinks     Types: 7 Glasses of wine per week  "    Comment: 6 OZ EVERY NIGHT    ·    Social History     Substance and Sexual Activity   Drug Use Never   ·     REVIEW OF SYSTEMS:  · Head: negative for headache  · Respiratory: negative for shortness of breath.   · Cardiovascular: negative for chest pain.   · Gastrointestinal: negative abdominal pain.   · Neurological: negative for LOC  · Psychiatric/Behavioral: negative for memory loss.   · All other systems reviewed and are negative    VITALS: /82 (BP Location: Left arm, Patient Position: Lying)   Pulse 75   Temp 98.4 °F (36.9 °C) (Oral)   Resp 18   Ht 182.9 cm (72\")   Wt 94.9 kg (209 lb 4.8 oz)   SpO2 99%   BMI 28.39 kg/m²  Body mass index is 28.39 kg/m².    PHYSICAL EXAM:   · CONSTITUTIONAL: A&Ox3, No acute distress  · LUNGS: Equal chest rise, no shortness of air  · CARDIOVASCULAR: palpable peripheral pulses  · SKIN: no skin lesions in the area examined  · EXTREMITY: His right knee looks great.  The incision is clean and dry.  He has very little swelling.  There is no drainage.  There is no erythema.  He does have slight calf tenderness that might be interpreted as a positive Homans' sign.    RADIOLOGY REVIEW:   No radiology results for the last 7 days    LABS:   Results for the past 24 hours:   Recent Results (from the past 24 hour(s))   COVID-19,[facility] IN-HOUSE, NP/OP SWAB IN UTM/VTM/[facility] TRANSPORT MEDIA,24 HR TAT - Swab, Nasopharynx    Collection Time: 12/23/20 12:25 PM    Specimen: Nasopharynx; Swab   Result Value Ref Range    COVID19 Not Detected Not Detected - Ref. Range   BNP    Collection Time: 12/23/20  2:27 PM    Specimen: Blood   Result Value Ref Range    proBNP 1,418.0 (H) 0.0 - 900.0 pg/mL   aPTT    Collection Time: 12/23/20  2:27 PM    Specimen: Blood   Result Value Ref Range    PTT 26.9 22.7 - 35.4 seconds   Protime-INR    Collection Time: 12/23/20  2:27 PM    Specimen: Blood   Result Value Ref Range    Protime 15.8 (H) 11.7 - 14.2 Seconds    INR 1.28 (H) 0.90 - 1.10 "   Sedimentation Rate    Collection Time: 12/23/20  2:27 PM    Specimen: Blood   Result Value Ref Range    Sed Rate 43 (H) 0 - 20 mm/hr   C-reactive Protein    Collection Time: 12/23/20  2:27 PM    Specimen: Blood   Result Value Ref Range    C-Reactive Protein 1.67 (H) 0.00 - 0.50 mg/dL   CBC Auto Differential    Collection Time: 12/23/20  2:27 PM    Specimen: Blood   Result Value Ref Range    WBC 9.78 3.40 - 10.80 10*3/mm3    RBC 3.26 (L) 4.14 - 5.80 10*6/mm3    Hemoglobin 9.8 (L) 13.0 - 17.7 g/dL    Hematocrit 29.9 (L) 37.5 - 51.0 %    MCV 91.7 79.0 - 97.0 fL    MCH 30.1 26.6 - 33.0 pg    MCHC 32.8 31.5 - 35.7 g/dL    RDW 11.5 (L) 12.3 - 15.4 %    RDW-SD 38.5 37.0 - 54.0 fl    MPV 9.5 6.0 - 12.0 fL    Platelets 357 140 - 450 10*3/mm3    Neutrophil % 77.7 (H) 42.7 - 76.0 %    Lymphocyte % 11.1 (L) 19.6 - 45.3 %    Monocyte % 8.8 5.0 - 12.0 %    Eosinophil % 0.7 0.3 - 6.2 %    Basophil % 0.2 0.0 - 1.5 %    Immature Grans % 1.5 (H) 0.0 - 0.5 %    Neutrophils, Absolute 7.59 (H) 1.70 - 7.00 10*3/mm3    Lymphocytes, Absolute 1.09 0.70 - 3.10 10*3/mm3    Monocytes, Absolute 0.86 0.10 - 0.90 10*3/mm3    Eosinophils, Absolute 0.07 0.00 - 0.40 10*3/mm3    Basophils, Absolute 0.02 0.00 - 0.20 10*3/mm3    Immature Grans, Absolute 0.15 (H) 0.00 - 0.05 10*3/mm3    nRBC 0.0 0.0 - 0.2 /100 WBC       IMPRESSION:  1.  Right total knee replacement December 14, 2020 correcting 11 degree varus deformity with 15 degree flexion contracture.  His knee itself is doing great postop though he is slow with physical therapy.  2.  Reportedly he had a Doppler study today which showed a femoral vein DVT.  He has a history of DVT in 2004.  3.  Pulmonary mass as noted on previous CT  4.  Diabetes mellitus  5.  Hypertension  6.  Coronary artery disease  7.  Immobilization syndrome.  As noted at home prior to his TKR his wife reports he was sitting in a chair for 2 months and almost never mobilized.  He had a small decubitus when he arrived at the  hospital for his TKR    PLAN:   · Admited to: Vicente Vick MD   · I am going to start anticoagulation now  · Medical consult pending  · Lab work pending  · I will repeat his Doppler study  · After get the Doppler study when it is felt safe we will resume physical therapy  ·   Vicente Vick Sr., MD  Orthopaedic Surgery  [facility]

## 2020-12-24 ENCOUNTER — APPOINTMENT (OUTPATIENT)
Dept: CT IMAGING | Facility: HOSPITAL | Age: 70
End: 2020-12-24

## 2020-12-24 ENCOUNTER — APPOINTMENT (OUTPATIENT)
Dept: CARDIOLOGY | Facility: HOSPITAL | Age: 70
End: 2020-12-24

## 2020-12-24 VITALS
DIASTOLIC BLOOD PRESSURE: 71 MMHG | HEIGHT: 72 IN | OXYGEN SATURATION: 97 % | BODY MASS INDEX: 28.35 KG/M2 | WEIGHT: 209.3 LBS | HEART RATE: 68 BPM | SYSTOLIC BLOOD PRESSURE: 134 MMHG | RESPIRATION RATE: 18 BRPM | TEMPERATURE: 97.2 F

## 2020-12-24 PROBLEM — R91.1 LUNG NODULE: Status: ACTIVE | Noted: 2020-12-24

## 2020-12-24 LAB
ANION GAP SERPL CALCULATED.3IONS-SCNC: 8.7 MMOL/L (ref 5–15)
BASOPHILS # BLD AUTO: 0.03 10*3/MM3 (ref 0–0.2)
BASOPHILS NFR BLD AUTO: 0.3 % (ref 0–1.5)
BH CV LOW VAS RIGHT PERONEAL VESSEL: 1
BH CV LOW VAS RIGHT POPLITEAL SPONT: 1
BH CV LOW VAS RIGHT POSTERIOR TIBIAL VESSEL: 1
BH CV LOW VAS RIGHT SOLEAL VESSEL: 1
BH CV LOWER VASCULAR LEFT COMMON FEMORAL AUGMENT: NORMAL
BH CV LOWER VASCULAR LEFT COMMON FEMORAL COMPETENT: NORMAL
BH CV LOWER VASCULAR LEFT COMMON FEMORAL COMPRESS: NORMAL
BH CV LOWER VASCULAR LEFT COMMON FEMORAL PHASIC: NORMAL
BH CV LOWER VASCULAR LEFT COMMON FEMORAL SPONT: NORMAL
BH CV LOWER VASCULAR RIGHT COMMON FEMORAL AUGMENT: NORMAL
BH CV LOWER VASCULAR RIGHT COMMON FEMORAL COMPETENT: NORMAL
BH CV LOWER VASCULAR RIGHT COMMON FEMORAL COMPRESS: NORMAL
BH CV LOWER VASCULAR RIGHT COMMON FEMORAL PHASIC: NORMAL
BH CV LOWER VASCULAR RIGHT COMMON FEMORAL SPONT: NORMAL
BH CV LOWER VASCULAR RIGHT DISTAL FEMORAL COMPRESS: NORMAL
BH CV LOWER VASCULAR RIGHT GASTRONEMIUS COMPRESS: NORMAL
BH CV LOWER VASCULAR RIGHT GREATER SAPH AK COMPRESS: NORMAL
BH CV LOWER VASCULAR RIGHT GREATER SAPH BK COMPRESS: NORMAL
BH CV LOWER VASCULAR RIGHT LESSER SAPH COMPRESS: NORMAL
BH CV LOWER VASCULAR RIGHT MID FEMORAL AUGMENT: NORMAL
BH CV LOWER VASCULAR RIGHT MID FEMORAL COMPETENT: NORMAL
BH CV LOWER VASCULAR RIGHT MID FEMORAL COMPRESS: NORMAL
BH CV LOWER VASCULAR RIGHT MID FEMORAL PHASIC: NORMAL
BH CV LOWER VASCULAR RIGHT MID FEMORAL SPONT: NORMAL
BH CV LOWER VASCULAR RIGHT PERONEAL COMPRESS: NORMAL
BH CV LOWER VASCULAR RIGHT PERONEAL THROMBUS: NORMAL
BH CV LOWER VASCULAR RIGHT POPLITEAL AUGMENT: NORMAL
BH CV LOWER VASCULAR RIGHT POPLITEAL COMPETENT: NORMAL
BH CV LOWER VASCULAR RIGHT POPLITEAL COMPRESS: NORMAL
BH CV LOWER VASCULAR RIGHT POPLITEAL PHASIC: NORMAL
BH CV LOWER VASCULAR RIGHT POPLITEAL SPONT: NORMAL
BH CV LOWER VASCULAR RIGHT POPLITEAL THROMBUS: NORMAL
BH CV LOWER VASCULAR RIGHT POSTERIOR TIBIAL COMPRESS: NORMAL
BH CV LOWER VASCULAR RIGHT POSTERIOR TIBIAL THROMBUS: NORMAL
BH CV LOWER VASCULAR RIGHT PROFUNDA FEMORAL COMPRESS: NORMAL
BH CV LOWER VASCULAR RIGHT PROXIMAL FEMORAL COMPRESS: NORMAL
BH CV LOWER VASCULAR RIGHT SAPHENOFEMORAL JUNCTION COMPRESS: NORMAL
BH CV LOWER VASCULAR RIGHT SOLEAL COMPRESS: NORMAL
BH CV LOWER VASCULAR RIGHT SOLEAL THROMBUS: NORMAL
BUN SERPL-MCNC: 8 MG/DL (ref 8–23)
BUN/CREAT SERPL: 16.3 (ref 7–25)
CALCIUM SPEC-SCNC: 9.3 MG/DL (ref 8.6–10.5)
CHLORIDE SERPL-SCNC: 105 MMOL/L (ref 98–107)
CO2 SERPL-SCNC: 25.3 MMOL/L (ref 22–29)
CREAT SERPL-MCNC: 0.49 MG/DL (ref 0.76–1.27)
DEPRECATED RDW RBC AUTO: 38.5 FL (ref 37–54)
EOSINOPHIL # BLD AUTO: 0.14 10*3/MM3 (ref 0–0.4)
EOSINOPHIL NFR BLD AUTO: 1.3 % (ref 0.3–6.2)
ERYTHROCYTE [DISTWIDTH] IN BLOOD BY AUTOMATED COUNT: 11.6 % (ref 12.3–15.4)
GFR SERPL CREATININE-BSD FRML MDRD: >150 ML/MIN/1.73
GLUCOSE BLDC GLUCOMTR-MCNC: 164 MG/DL (ref 70–130)
GLUCOSE BLDC GLUCOMTR-MCNC: 183 MG/DL (ref 70–130)
GLUCOSE SERPL-MCNC: 144 MG/DL (ref 65–99)
HBA1C MFR BLD: 6.9 % (ref 4.8–5.6)
HCT VFR BLD AUTO: 29.1 % (ref 37.5–51)
HGB BLD-MCNC: 9.6 G/DL (ref 13–17.7)
IMM GRANULOCYTES # BLD AUTO: 0.16 10*3/MM3 (ref 0–0.05)
IMM GRANULOCYTES NFR BLD AUTO: 1.5 % (ref 0–0.5)
LYMPHOCYTES # BLD AUTO: 1.65 10*3/MM3 (ref 0.7–3.1)
LYMPHOCYTES NFR BLD AUTO: 15.4 % (ref 19.6–45.3)
MCH RBC QN AUTO: 30.3 PG (ref 26.6–33)
MCHC RBC AUTO-ENTMCNC: 33 G/DL (ref 31.5–35.7)
MCV RBC AUTO: 91.8 FL (ref 79–97)
MONOCYTES # BLD AUTO: 1.02 10*3/MM3 (ref 0.1–0.9)
MONOCYTES NFR BLD AUTO: 9.5 % (ref 5–12)
NEUTROPHILS NFR BLD AUTO: 7.7 10*3/MM3 (ref 1.7–7)
NEUTROPHILS NFR BLD AUTO: 72 % (ref 42.7–76)
NRBC BLD AUTO-RTO: 0 /100 WBC (ref 0–0.2)
PLATELET # BLD AUTO: 357 10*3/MM3 (ref 140–450)
PMV BLD AUTO: 9.4 FL (ref 6–12)
POTASSIUM SERPL-SCNC: 3.4 MMOL/L (ref 3.5–5.2)
RBC # BLD AUTO: 3.17 10*6/MM3 (ref 4.14–5.8)
SODIUM SERPL-SCNC: 139 MMOL/L (ref 136–145)
TROPONIN T SERPL-MCNC: <0.01 NG/ML (ref 0–0.03)
WBC # BLD AUTO: 10.7 10*3/MM3 (ref 3.4–10.8)

## 2020-12-24 PROCEDURE — 93010 ELECTROCARDIOGRAM REPORT: CPT | Performed by: INTERNAL MEDICINE

## 2020-12-24 PROCEDURE — 85025 COMPLETE CBC W/AUTO DIFF WBC: CPT | Performed by: INTERNAL MEDICINE

## 2020-12-24 PROCEDURE — 93005 ELECTROCARDIOGRAM TRACING: CPT | Performed by: NURSE PRACTITIONER

## 2020-12-24 PROCEDURE — 93971 EXTREMITY STUDY: CPT

## 2020-12-24 PROCEDURE — G0378 HOSPITAL OBSERVATION PER HR: HCPCS

## 2020-12-24 PROCEDURE — 82962 GLUCOSE BLOOD TEST: CPT

## 2020-12-24 PROCEDURE — 63710000001 INSULIN LISPRO (HUMAN) PER 5 UNITS: Performed by: INTERNAL MEDICINE

## 2020-12-24 PROCEDURE — 84484 ASSAY OF TROPONIN QUANT: CPT | Performed by: NURSE PRACTITIONER

## 2020-12-24 PROCEDURE — 71275 CT ANGIOGRAPHY CHEST: CPT

## 2020-12-24 PROCEDURE — 83036 HEMOGLOBIN GLYCOSYLATED A1C: CPT | Performed by: INTERNAL MEDICINE

## 2020-12-24 PROCEDURE — 80048 BASIC METABOLIC PNL TOTAL CA: CPT | Performed by: INTERNAL MEDICINE

## 2020-12-24 PROCEDURE — 0 IOPAMIDOL PER 1 ML: Performed by: ORTHOPAEDIC SURGERY

## 2020-12-24 RX ORDER — METOPROLOL TARTRATE 50 MG/1
100 TABLET, FILM COATED ORAL EVERY 12 HOURS SCHEDULED
Status: DISCONTINUED | OUTPATIENT
Start: 2020-12-24 | End: 2020-12-24 | Stop reason: HOSPADM

## 2020-12-24 RX ORDER — TRAMADOL HYDROCHLORIDE 50 MG/1
50 TABLET ORAL EVERY 6 HOURS PRN
Qty: 8 TABLET | Refills: 0 | Status: SHIPPED | OUTPATIENT
Start: 2020-12-24

## 2020-12-24 RX ORDER — METOPROLOL TARTRATE 50 MG/1
100 TABLET, FILM COATED ORAL ONCE
Status: COMPLETED | OUTPATIENT
Start: 2020-12-24 | End: 2020-12-24

## 2020-12-24 RX ORDER — RAMIPRIL 10 MG/1
10 CAPSULE ORAL
Status: DISCONTINUED | OUTPATIENT
Start: 2020-12-24 | End: 2020-12-24 | Stop reason: HOSPADM

## 2020-12-24 RX ADMIN — INSULIN LISPRO 2 UNITS: 100 INJECTION, SOLUTION INTRAVENOUS; SUBCUTANEOUS at 09:33

## 2020-12-24 RX ADMIN — METOPROLOL TARTRATE 100 MG: 50 TABLET, FILM COATED ORAL at 09:33

## 2020-12-24 RX ADMIN — RAMIPRIL 10 MG: 10 CAPSULE ORAL at 09:33

## 2020-12-24 RX ADMIN — METOPROLOL TARTRATE 100 MG: 50 TABLET, FILM COATED ORAL at 02:19

## 2020-12-24 RX ADMIN — RIVAROXABAN 15 MG: 15 TABLET, FILM COATED ORAL at 11:38

## 2020-12-24 RX ADMIN — IOPAMIDOL 90 ML: 755 INJECTION, SOLUTION INTRAVENOUS at 08:30

## 2020-12-24 RX ADMIN — INSULIN LISPRO 2 UNITS: 100 INJECTION, SOLUTION INTRAVENOUS; SUBCUTANEOUS at 11:39

## 2020-12-24 RX ADMIN — TRAMADOL HYDROCHLORIDE 50 MG: 50 TABLET, FILM COATED ORAL at 10:29

## 2020-12-24 NOTE — PROGRESS NOTES
Right LE venous duplex complete.  Preliminary report: positive for acute DVT in right LE, reported to Atiya Alexandra RN.

## 2020-12-24 NOTE — NURSING NOTE
Spoke with BLAKE LOPEZ; she will review chart regarding holding of Metoprolol, but considers HR in 90's to be stable.

## 2020-12-24 NOTE — PROGRESS NOTES
"Adult Nutrition  Assessment/PES    Patient Name:  Demetrio Buckley  YOB: 1950  MRN: [id number]  Admit Date:  12/23/2020    Assessment Date:  12/24/2020    Comments:  Assessment triggered by skin risk     EMR reviewed. Pt underwent RTKA on 12/14/20. Admitted with DVT in RLE and CTA chest shows PE as well. R knee incision noted, but no active pressure injury (coccyx PI listed from previous admission). On regular/cardiac diet. Diabetic - may benefit from consistent carb addition. Will continue to monitor clinical course, intake.     Reason for Assessment     Row Name 12/24/20 0822          Reason for Assessment    Reason For Assessment  identified at risk by screening criteria     Diagnosis  hematological/related complications     Identified At Risk by Screening Criteria  large or nonhealing wound, burn or pressure injury         Nutrition/Diet History     Row Name 12/24/20 0823          Nutrition/Diet History    Typical Food/Fluid Intake  RTKA on 12/14/20 - direct admit with blood clot to RLE. Ate 100% of 2 snacks last night.         Anthropometrics     Row Name 12/24/20 0824          Anthropometrics    Height  182.9 cm (72\")        Admit Weight    Admit Weight  94.8 kg (209 lb)        Ideal Body Weight (IBW)    Ideal Body Weight (IBW) (kg)  82.07        Usual Body Weight (UBW)    Usual Body Weight  96.6 kg (213 lb) 12/14/20 - surgery        Body Mass Index (BMI)    BMI Assessment  BMI 25-29.9: overweight 28.4         Labs/Tests/Procedures/Meds     Row Name 12/24/20 0824          Labs/Procedures/Meds    Lab Results Reviewed  reviewed     Lab Results Comments  GLu, Aic 6.9%, K, CRP        Diagnostic Tests/Procedures    Diagnostic Test/Procedure Reviewed  reviewed     Diagnostic Test/Procedures Comments  MRI, CTA chest (PE - left)        Medications    Pertinent Medications Reviewed  reviewed     Pertinent Medications Comments  lovenox, insulin,         Physical Findings     Row Name 12/24/20 0894       " "   Physical Findings    Skin  surgical incision R knee incision, some mild edema noted - no current PI. Coccyx wound/PI from 12/14         Estimated/Assessed Needs     Row Name 12/24/20 0824          Calculation Measurements    Height  182.9 cm (72\")         Nutrition Prescription Ordered     Row Name 12/24/20 0827          Nutrition Prescription PO    Current PO Diet  Regular     Fluid Consistency  Thin     Common Modifiers  Cardiac         Evaluation of Received Nutrient/Fluid Intake     Row Name 12/24/20 0827          PO Evaluation    % PO Intake  100% x 2 snacks               Problem/Interventions:  Problem 1     Row Name 12/24/20 0827          Nutrition Diagnoses Problem 1    Problem 1  Nutrition Appropriate for Condition at this Time               Intervention Goal     Row Name 12/24/20 0827          Intervention Goal    General  Maintain nutrition;Disease management/therapy     PO  Tolerate PO;PO intake (%)     PO Intake %  75 %     Weight  No significant weight loss         Nutrition Intervention     Row Name 12/24/20 0827          Nutrition Intervention    RD/Tech Action  Follow Tx progress;Care plan reviewd;Encourage intake           Education/Evaluation     Row Name 12/24/20 0827          Education    Education  Will Instruct as appropriate        Monitor/Evaluation    Monitor  Per protocol           Electronically signed by:  Rakel Kilpatrick RD  12/24/20 08:28 EST  "

## 2020-12-24 NOTE — PROGRESS NOTES
Continued Stay Note  [facility]     Patient Name: Demetrio Buckley  MRN: [id number]  Today's Date: 12/24/2020    Admit Date: 12/23/2020    Discharge Plan     Row Name 12/24/20 1036       Plan    Plan Comments  Discussed case with Nayan/pharmacist. Patient to discharge on Xarelto. 1st 30 days are $0.00/co-pay because of prescription savings card. Unable to check cost of additional refills due to start of new benefits. Instructed patient to request refill before he runs out of medication so he will know cost and can contact pharmacy or MD for assistance if needed. Verbalized understanding.    Row Name 12/24/20 1026       Plan    Plan  Plans dc home with assist of family and [facility].    Plan Comments  Spoke with patient at bedside regarding dc plans/needs. Recently dc'ed from [facility] s/p knee surgery with [facility]. Call placed to [facility] who confirms patient is current and will continue to follow. Family to transport per private auto. No additional needs noted. Continue to follow.        Discharge Codes    No documentation.             Angelica Werner RN

## 2020-12-24 NOTE — PROGRESS NOTES
Case Management Discharge Note      Final Note: Home with family and [facility]. [facility] notified of dc today. Transport per private auto. No addiitional needs noted.    Provided Post Acute Provider List?: N/A  N/A Provider List Comment: Current with [facility]  Provided Post Acute Provider Quality & Resource List?: N/A  N/A Quality & Resource List Comment: Current with [facility]    Selected Continued Care - Admitted Since 12/23/2020     Destination    No services have been selected for the patient.              Durable Medical Equipment    No services have been selected for the patient.              Dialysis/Infusion    No services have been selected for the patient.              Home Medical Care Coordination complete    Service Provider Selected Services Address Phone Fax Patient Preferred    [facility] - [facility] [address] [id number] [phone] --          Therapy    No services have been selected for the patient.              Community Resources    No services have been selected for the patient.                Selected Continued Care - Prior Encounters Includes selections from prior encounters from 9/24/2020 to 12/24/2020    Discharged on 12/18/2020 Admission date: 12/14/2020 - Discharge disposition: Home or Self Care    Home Medical Care     Service Provider Selected Services Address Phone Fax Patient Preferred    [facility] [address] [id number] [phone] --                         Final Discharge Disposition Code: 06 - home with home health care

## 2020-12-24 NOTE — PROGRESS NOTES
"Pharmacy Consult - [facility]    Demetrio Buckley has been consulted for pharmacy to dose enoxaparin for DVT prophylaxis per Dr. Vick's request.         Relevant clinical data and objective history reviewed:  70 y.o. male 182.9 cm (72\") 94.9 kg (209 lb 4.8 oz)    Home Anticoagulation: no    Past Medical History:   Diagnosis Date    Arthritis     Atherosclerosis     Coronary artery disease     Diabetes mellitus (CMS/[facility])     TYPE 2    Hyperlipidemia     Hypertension     Knee pain, right     Limited joint range of motion     RIGHT KNEE    Limited range of motion (ROM) of shoulder     RIGHT SHOULDER STIFF, GOING TO HAVE AN MRI ON SHOULDER 12-8-2020    PAF (paroxysmal atrial fibrillation) (CMS/[facility])     FOLLOWS WITH  DR. SYEDA DALAL (postoperative nausea and vomiting)     IN THE PAST     has No Known Allergies.    Lab Results   Component Value Date    WBC 9.78 12/23/2020    HGB 9.8 (L) 12/23/2020    HCT 29.9 (L) 12/23/2020    MCV 91.7 12/23/2020     12/23/2020     Lab Results   Component Value Date    GLUCOSE 190 (H) 12/15/2020    CALCIUM 9.6 12/15/2020     12/15/2020    K 3.6 12/15/2020    CO2 25.7 12/15/2020     12/15/2020    BUN 16 12/15/2020    CREATININE 0.69 (L) 12/15/2020    EGFRIFNONA 113 12/15/2020    BCR 23.2 12/15/2020    ANIONGAP 7.3 12/15/2020       Estimated Creatinine Clearance: 102.7 mL/min (A) (by C-G formula based on SCr of 0.69 mg/dL (L)).      Assessment/Plan    Will start patient on 40 mg subcutaneous every 24 hours, adjusted for renal function.  Pharmacy will continue to follow but consult will be discontinued at this time.     Brent Harley, PharmD  PGY1 Pharmacy Resident     "

## 2020-12-24 NOTE — DISCHARGE PLACEMENT REQUEST
"Omar Trinidad (70 y.o. Male)     Date of Birth Social Security Number Address Home Phone MRN    1950  [address] [id number] [phone] [id number]    Adventism Marital Status          Latter-day        Admission Date Admission Type Admitting Provider Attending Provider Department, Room/Bed    12/23/20 Urgent Vicente Vick MD Sweet, Richard A, MD [facility] [address], P393/1    Discharge Date Discharge Disposition Discharge Destination                       Attending Provider: Vicente Vick MD    Allergies: No Known Allergies    Isolation: None   Infection: None   Code Status: CPR    Ht: 182.9 cm (72\")   Wt: 94.9 kg (209 lb 4.8 oz)    Admission Cmt: None   Principal Problem: None                Active Insurance as of 12/23/2020     Primary Coverage     Payor Plan Insurance Group Employer/Plan Group    HUMANA MEDICARE REPLACEMENT HUMANA MEDICARE REPLACEMENT [id number]     Payor Plan Address Payor Plan Phone Number Payor Plan Fax Number Effective Dates    [address] [address] [phone]  1/1/2018 - None Entered    [facility] [address]       Subscriber Name Subscriber Birth Date Member ID       OMAR TRINIDAD 1950 [id number]                 Emergency Contacts      (Rel.) Home Phone Work Phone Mobile Phone    VIVIDAVID VALDOVINOSE (Spouse) [phone] -- --            Emergency Contact Information     Name Relation Home Work Mobile    NAHUM TRINIDAD Spouse [phone]            Insurance Information                HUMANA MEDICARE REPLACEMENT/HUMANA MEDICARE REPLACEMENT Phone: [phone]    Subscriber: Omar Trinidad Subscriber#: [id number]    Group#: [id number] Precert#:           "

## 2020-12-24 NOTE — SIGNIFICANT NOTE
Pt is a 71 yo M s/p R TKA 12/14. Pt admitted for management of acute R femoral DVT, started on Lovenox 12/23PM. Per APTA guidelines, pt safe for mobilization after 5 hours and seen at bedside today. Pt denies need for acute PT, ambulates with SBA with use of walker in room and performs therex independently. Currently with elevated pain and not wanting to perform additional therex at this time. Will DC from caseload, pt safe for DC home with assist today and HHPT to follow.

## 2020-12-24 NOTE — PROGRESS NOTES
Discharge Planning Assessment  [facility]     Patient Name: Demetrio Buckley  MRN: [id number]  Today's Date: 12/24/2020    Admit Date: 12/23/2020    Discharge Needs Assessment     Row Name 12/24/20 1020       Living Environment    Lives With  child(stephie), adult;spouse    Name(s) of Who Lives With Patient  Alayna Buckley/spouse  [phone]    Current Living Arrangements  home/apartment/condo    Primary Care Provided by  spouse/significant other    Provides Primary Care For  child(stephie)    Caregiving Concerns  Has a dependent adult daughter    Family Caregiver if Needed  spouse    Family Caregiver Names  Alayna Buckley/spouse  [phone]    Quality of Family Relationships  helpful;involved;supportive    Able to Return to Prior Arrangements  yes    Living Arrangement Comments  Lives with family in a multi-level house. Two steps to enter/no handrails.       Resource/Environmental Concerns    Resource/Environmental Concerns  none       Transition Planning    Patient/Family Anticipates Transition to  home with help/services;home with family    Patient/Family Anticipated Services at Transition  ;home health care    Transportation Anticipated  family or friend will provide       Discharge Needs Assessment    Readmission Within the Last 30 Days  current reason for admission unrelated to previous admission    Current Outpatient/Agency/Support Group  homecare agency    Equipment Currently Used at Home  glucometer;walker, rolling    Concerns to be Addressed  medication;basic needs;home safety;discharge planning    Concerns Comments  Checking cost of medication (Xarelto)    Anticipated Changes Related to Illness  none    Equipment Needed After Discharge  none    Outpatient/Agency/Support Group Needs  homecare agency    Discharge Facility/Level of Care Needs  home with home health    Provided Post Acute Provider List?  N/A    N/A Provider List Comment  [facility] with [facility]    Provided Post Acute Provider  Quality & Resource List?  N/A    N/A Quality & Resource List Comment  [facility]     Patient's Choice of Community Agency(s)  [facility] Denies using [facility]F.    Discharge Coordination/Progress  Spoke with patient at bedside. Confirmed information on facesheet. [place] 12/24/2020.        Discharge Plan     Row Name 12/24/20 1026       Plan    Plan  Plans dc home with assist of family and [facility] .    Plan Comments  Spoke with patient at bedside regarding dc plans/needs. Recently dc'ed from [facility] s/p knee surgery with [facility] . Call placed to [facility]  who confirms patient is current and will continue to follow. Family to transport per private auto. No additional needs noted. Continue to follow.        Continued Care and Services - Admitted Since 12/23/2020     Home Medical Care Coordination complete    Service Provider Request Status Selected Services Address Phone Fax Patient Preferred    [facility] - [facility]   Selected Home Health Services [address] [address] [phone] --            Selected Continued Care - Prior Encounters Includes selections from prior encounters from 9/24/2020 to 12/24/2020    Discharged on 12/18/2020 Admission date: 12/14/2020 - Discharge disposition: Home or Self Care    Home Medical Care     Service Provider Selected Services Address Phone Fax Patient Preferred    [facility] - [facility]  Home Health Services [address], [place] [address] [phone] --                      Demographic Summary     Row Name 12/24/20 1018       General Information    Admission Type  observation    Required Notices Provided  Observation Status Notice    Referral Source  admission list    Reason for Consult  discharge planning        Functional Status     Row Name 12/24/20 1020       Functional Status    Usual Activity Tolerance  good    Current Activity  Tolerance  moderate       Functional Status, IADL    Medications  assistive person    Meal Preparation  assistive person    Housekeeping  assistive person    Laundry  assistive person    Shopping  assistive person        Psychosocial    No documentation.       Abuse/Neglect    No documentation.       Legal    No documentation.       Substance Abuse    No documentation.       Patient Forms    No documentation.           Angelica Werner RN

## 2020-12-24 NOTE — CONSULTS
CONSULT NOTE    INTERNAL MEDICINE   [facility]       Patient Identification:  Name: Demetrio Buckley  Age: 70 y.o.  Sex: male  :  1950  MRN: [id number]             Date of Consultation:  20          Primary Care Physician: Matt Holloway MD                               Requesting Physician: dr moser    Reason for Consultation:dvt    Chief Complaint:  70 year old gentleman who was admitted after he had an outpatient doppler which was positive for a dvt; he recently had a right knee replacement and had swelling and pain like he did when he had a prior dvt following cabg some years ago; we are asked to see him regarding medical management    History of Present Illness:   As above      Past Medical History:  Past Medical History:   Diagnosis Date   • Arthritis    • Atherosclerosis    • Coronary artery disease    • Diabetes mellitus (CMS/[facility])     TYPE 2   • Hyperlipidemia    • Hypertension    • Knee pain, right    • Limited joint range of motion     RIGHT KNEE   • Limited range of motion (ROM) of shoulder     RIGHT SHOULDER STIFF, GOING TO HAVE AN MRI ON SHOULDER 2020   • PAF (paroxysmal atrial fibrillation) (CMS/[facility])     FOLLOWS WITH  DR. LANDA   • PONV (postoperative nausea and vomiting)     IN THE PAST     Past Surgical History:  Past Surgical History:   Procedure Laterality Date   • CATARACT EXTRACTION WITH INTRAOCULAR LENS IMPLANT Right    • CHOLECYSTECTOMY OPEN     • COLONOSCOPY     • CORONARY ARTERY BYPASS GRAFT      2 VESSELS   • KNEE ARTHROSCOPY Right     MENISCUS REPAIR   • TOTAL KNEE ARTHROPLASTY Right 2020    Procedure: RIGHT TOTAL KNEE ARTHROPLASTY;  Surgeon: Vicente Moser MD;  Location: [facility];  Service: Orthopedics;  Laterality: Right;   • WISDOM TOOTH EXTRACTION        Home Meds:  Medications Prior to Admission   Medication Sig Dispense Refill Last Dose   • acetaminophen (TYLENOL) 500 MG tablet Take 1,000 mg by mouth Every  6 (Six) Hours As Needed for Mild Pain .      • aspirin  MG tablet Take 1 tablet by mouth Daily for 42 days. 40 tablet 0    • atorvastatin (LIPITOR) 40 MG tablet Take 40 mg by mouth Every Night.      • glimepiride (AMARYL) 2 MG tablet Take 2 mg by mouth Every Morning Before Breakfast.      • hydroCHLOROthiazide (HYDRODIURIL) 25 MG tablet Take 25 mg by mouth As Needed.      • meloxicam (MOBIC) 7.5 MG tablet Take 7.5 mg by mouth Daily.      • metFORMIN (GLUCOPHAGE) 250 MG half tablet Take 250 mg by mouth Daily With Dinner.      • metFORMIN (GLUCOPHAGE) 500 MG tablet Take 500 mg by mouth Daily With Breakfast.      • metoprolol tartrate (LOPRESSOR) 100 MG tablet Take 100 mg by mouth 2 (Two) Times a Day.      • ramipril (ALTACE) 10 MG capsule Take 10 mg by mouth Daily.      • terazosin (HYTRIN) 5 MG capsule Take 5 mg by mouth Every Night.        Current Meds:     Current Facility-Administered Medications:   •  acetaminophen (TYLENOL) tablet 650 mg, 650 mg, Oral, Q4H PRN **OR** acetaminophen (TYLENOL) 160 MG/5ML solution 650 mg, 650 mg, Oral, Q4H PRN **OR** acetaminophen (TYLENOL) suppository 650 mg, 650 mg, Rectal, Q4H PRN, Gina Orozco MD  •  acetaminophen (TYLENOL) tablet 650 mg, 650 mg, Oral, Q4H PRN, Vicente Vick MD  •  atorvastatin (LIPITOR) tablet 40 mg, 40 mg, Oral, Nightly, Gina Orozco MD  •  dextrose (D50W) 25 g/ 50mL Intravenous Solution 25 g, 25 g, Intravenous, Q15 Min PRN, Gina Orozco MD  •  dextrose (GLUTOSE) oral gel 15 g, 15 g, Oral, Q15 Min PRN, Gina Orozco MD  •  enoxaparin (LOVENOX) syringe 40 mg, 40 mg, Subcutaneous, Q24H, Vicente Vikc MD  •  glucagon (human recombinant) (GLUCAGEN DIAGNOSTIC) injection 1 mg, 1 mg, Subcutaneous, Q15 Min PRN, Gina Orozco MD  •  HYDROcodone-acetaminophen (NORCO) 7.5-325 MG per tablet 2 tablet, 2 tablet, Oral, Q4H PRN, Vicente Vick MD  •  insulin lispro (humaLOG, ADMELOG) injection 0-9 Units, 0-9  Units, Subcutaneous, TID With Meals, Gina Orozco MD  •  ondansetron (ZOFRAN) injection 4 mg, 4 mg, Intravenous, Q6H PRN, Gina Orozco MD  •  sodium chloride 0.9 % flush 10 mL, 10 mL, Intravenous, Q12H, Vicente Vick MD  •  sodium chloride 0.9 % flush 10 mL, 10 mL, Intravenous, PRN, Vicente Vick MD  •  terazosin (HYTRIN) capsule 5 mg, 5 mg, Oral, Nightly, Gina Orozco MD  •  traMADol (ULTRAM) tablet 50 mg, 50 mg, Oral, Q6H PRN, Vicente Vick MD  Allergies:  No Known Allergies  Social History:   Social History     Socioeconomic History   • Marital status:      Spouse name: Not on file   • Number of children: Not on file   • Years of education: Not on file   • Highest education level: Not on file   Tobacco Use   • Smoking status: Former Smoker     Packs/day: 0.75     Years: 14.00     Pack years: 10.50     Types: Cigarettes     Quit date:      Years since quittin.0   • Smokeless tobacco: Never Used   Substance and Sexual Activity   • Alcohol use: Yes     Alcohol/week: 7.0 standard drinks     Types: 7 Glasses of wine per week     Comment: 6 OZ EVERY NIGHT    • Drug use: Never   • Sexual activity: Defer     Family History:  Family History   Problem Relation Age of Onset   • Malig Hyperthermia Neg Hx           Review of Systems  See history of present illness and past medical history.  Patient denies headache, dizziness, syncope, falls, trauma, change in vision, change in hearing, change in taste, changes in weight, changes in appetite, focal weakness, numbness, or paresthesia.  Patient denies chest pain, palpitations, dyspnea, orthopnea, PND, cough, sinus pressure, rhinorrhea, epistaxis, hemoptysis, nausea, vomiting,hematemesis, diarrhea, constipation or hematchezia.  Denies cold or heat intolerance, polydipsia, polyuria, polyphagia. Denies hematuria, pyuria, dysuria, hesitancy, frequency or urgency. Denies consumption of raw and under cooked meats foods or  "change in water source.  Denies fever, chills, sweats, night sweats.  Denies missing any routine medications. Remainder of ROS is negative.      Vitals:   /82 (BP Location: Left arm, Patient Position: Lying)   Pulse 75   Temp 98.4 °F (36.9 °C) (Oral)   Resp 18   Ht 182.9 cm (72\")   Wt 94.9 kg (209 lb 4.8 oz)   SpO2 99%   BMI 28.39 kg/m²   I/O:     Intake/Output Summary (Last 24 hours) at 12/23/2020 1927  Last data filed at 12/23/2020 1655  Gross per 24 hour   Intake 720 ml   Output 200 ml   Net 520 ml     Exam:  General Appearance:    Alert, cooperative, no distress, appears stated age   Head:    Normocephalic, without obvious abnormality, atraumatic   Eyes:    PERRL, conjunctivae/corneas clear, EOM's intact, both eyes   Ears:    Normal external ear canals, both ears   Nose:   Nares normal, septum midline, mucosa normal, no drainage    or sinus tenderness   Throat:   Lips, tongue, gums normal; oral mucosa pink and moist   Neck:   Supple, symmetrical, trachea midline, no adenopathy;     thyroid:  no enlargement/tenderness/nodules; no carotid    bruit or JVD   Back:     Symmetric, no curvature, ROM normal, no CVA tenderness   Lungs:     Decreased breath sounds bilaterally, respirations unlabored   Chest Wall:    No tenderness or deformity    Heart:    Regular rate and rhythm, S1 and S2 normal, no murmur, rub   or gallop   Abdomen:     Soft, nontender, bowel sounds active all four quadrants,     no masses, no hepatomegaly, no splenomegaly   Extremities:   Extremities normal, atraumatic, no cyanosis or edema   Pulses:   Pulses palpable in all extremities; symmetric all extremities   Skin:   Skin color normal, Skin is warm and dry,  no rashes or palpable lesions   Neurologic:   CNII-XII intact, motor strength grossly intact, sensation grossly intact to light touch, no focal deficits noted       Data Review:  Labs in chart were reviewed.  WBC   Date Value Ref Range Status   12/23/2020 9.78 3.40 - 10.80 " 10*3/mm3 Final     Hemoglobin   Date Value Ref Range Status   12/23/2020 9.8 (L) 13.0 - 17.7 g/dL Final     Hematocrit   Date Value Ref Range Status   12/23/2020 29.9 (L) 37.5 - 51.0 % Final     Platelets   Date Value Ref Range Status   12/23/2020 357 140 - 450 10*3/mm3 Final     No results found for: NA, K, CL, CO2, BUN, CREATININE, GLUCOSE  No results found for: CALCIUM, MG, PHOS  No results found for: AST, ALT, ALKPHOS            Imaging Results (Last 7 Days)     ** No results found for the last 168 hours. **        Past Medical History:   Diagnosis Date   • Arthritis    • Atherosclerosis    • Coronary artery disease    • Diabetes mellitus (CMS/[facility])     TYPE 2   • Hyperlipidemia    • Hypertension    • Knee pain, right    • Limited joint range of motion     RIGHT KNEE   • Limited range of motion (ROM) of shoulder     RIGHT SHOULDER STIFF, GOING TO HAVE AN MRI ON SHOULDER 12-8-2020   • PAF (paroxysmal atrial fibrillation) (CMS/[facility])     FOLLOWS WITH  DR. LANDA   • PONV (postoperative nausea and vomiting)     IN THE PAST       Assessment:  Active Hospital Problems    Diagnosis  POA   • DVT (deep venous thrombosis) (CMS/[facility]) [I82.409]  Yes      Resolved Hospital Problems   No resolved problems to display.   cad  Diabetes  Hypertension  Paroxysmal atrial fibrillation      Plan:  Will start lovenox for tonight   Can likely transition to eliquis or xarelto tomorrow  Check labs and cta chest  Would ask hematology for an opinion regarding length of ac considering this is his second episode  In addition he also has a history of paf  Thanks for involving us in his care  Will follow with you    Gnia Orozco MD   12/23/2020  19:27 EST

## 2020-12-24 NOTE — PROGRESS NOTES
[facility]     LOS: 1 day     Subjective:    CC:No chief complaint on file.    DIET:  Diet Order   Procedures   • Diet Regular; Cardiac   tolerating po intake  No cp  No soa  No n.v,d  Objective:    Vital Signs:  Temp:  [97.2 °F (36.2 °C)-99 °F (37.2 °C)] 97.2 °F (36.2 °C)  Heart Rate:  [60-98] 68  Resp:  [18-20] 18  BP: (134-193)/(71-86) 134/71    SpO2:  [97 %-99 %] 97 %  on   ;   Device (Oxygen Therapy): room air  Body mass index is 28.39 kg/m².    Physical Exam  Constitutional:       Appearance: He is well-developed.   HENT:      Head: Normocephalic and atraumatic.   Cardiovascular:      Heart sounds: No murmur. No friction rub.   Pulmonary:      Effort: Pulmonary effort is normal.      Breath sounds: Normal breath sounds.   Abdominal:      General: Bowel sounds are normal. There is no distension.      Palpations: Abdomen is soft.      Tenderness: There is no abdominal tenderness.   Skin:     General: Skin is warm and dry.   Neurological:      Mental Status: He is alert.         Results Review:    Glucose   Date Value Ref Range Status   12/24/2020 144 (H) 65 - 99 mg/dL Final     Results from last 7 days   Lab Units 12/24/20  0128   WBC 10*3/mm3 10.70   HEMOGLOBIN g/dL 9.6*   HEMATOCRIT % 29.1*   PLATELETS 10*3/mm3 357     Results from last 7 days   Lab Units 12/24/20  0128   SODIUM mmol/L 139   POTASSIUM mmol/L 3.4*   CHLORIDE mmol/L 105   CO2 mmol/L 25.3   BUN mg/dL 8   CREATININE mg/dL 0.49*   CALCIUM mg/dL 9.3   GLUCOSE mg/dL 144*     Results from last 7 days   Lab Units 12/23/20  1427   INR  1.28*   APTT seconds 26.9         Results from last 7 days   Lab Units 12/24/20  0128   TROPONIN T ng/mL <0.010     Cultures:  No results found for: BLOODCX, URINECX, WOUNDCX, MRSACX, RESPCX, STOOLCX    I have reviewed daily medications and changes in CPOE    Scheduled meds  atorvastatin, 40 mg, Oral, Nightly  insulin lispro, 0-9 Units, Subcutaneous, TID With Meals  metoprolol tartrate, 100  mg, Oral, Q12H  ramipril, 10 mg, Oral, Q24H  rivaroxaban, 15 mg, Oral, BID With Meals    Followed by  [START ON 1/15/2021] rivaroxaban, 20 mg, Oral, Daily With Dinner  sodium chloride, 10 mL, Intravenous, Q12H  terazosin, 5 mg, Oral, Nightly           PRN meds  •  acetaminophen **OR** acetaminophen **OR** acetaminophen  •  acetaminophen  •  dextrose  •  dextrose  •  glucagon (human recombinant)  •  HYDROcodone-acetaminophen  •  ondansetron  •  sodium chloride  •  traMADol        DVT (deep venous thrombosis) (CMS/HCC)    Lung nodule        Assessment/Plan:    Transition to eliquis, dosing d/w patient  CT results reviewed with the patient, including need for follow up on lung nodule, increased concern for malignancy because of DVT.  Follow up with PMD outpatient to discuss duration of anticoagulation  As this is his second episode    Ultram written for patient as he had bad reaction with hydrocodone. Risks and benefits of pain medication discussed with the patient.    >35 minutes spent, > 1/2 time spent counseling and coordination of care on dvt and lung nodule    Ok for d/c  Manuel Hein MD  12/24/20  12:17 EST

## 2020-12-24 NOTE — PROGRESS NOTES
"Called by RN regarding patients heart rate.  She reported the patient had a heart rate in the 70s at the beginning of her shift and now has a heart rate in the 90s.  She reports the patient is flushed and is complaining of his heart \"pounding.\"  RN reports she can feel the patient's heart \"pounding\" through his chest wall and is requesting the patient receive a dose of his Metoprolol that was not ordered by his admitting physician.  Explained to the RN that a heart rate in the 90s is stable.  Stat EKG ordered and reviewed, shows sinus rhythm with PACs.  Troponin pending. Will order a one time dose of patient's Metoprolol as this is the second time the RN has called the service regarding these complaints. Continue to monitor.    JESSICA Holman  [facility]   " ESBL e. coli

## 2020-12-24 NOTE — NURSING NOTE
Received call from BLAKE LOPEZ; she will review chart for possible reason that Metoprolol not ordered.

## 2020-12-24 NOTE — PROGRESS NOTES
"  Orthopaedic Surgery   Daily Progress Note  Dr. Vicente Vick Sr  [phone]  DEMOGRAPHICS:   · Demetrio Buckley   · Age:70 y.o.   · MRN:[id number]  · Admitted: 12/23/2020    PROCEDURE:   s/p * No surgery found *     BP (!) 193/86 (BP Location: Right arm, Patient Position: Lying) Comment: nurse notified  Pulse 70   Temp 98.4 °F (36.9 °C) (Oral)   Resp 18   Ht 182.9 cm (72\")   Wt 94.9 kg (209 lb 4.8 oz)   SpO2 98%   BMI 28.39 kg/m²     Lab Results (last 24 hours)     Procedure Component Value Units Date/Time    POC Glucose Once [[id number]]  (Abnormal) Collected: 12/24/20 0723    Specimen: Blood Updated: 12/24/20 0724     Glucose 164 mg/dL     Basic Metabolic Panel [[id number]]  (Abnormal) Collected: 12/24/20 0128    Specimen: Blood Updated: 12/24/20 0213     Glucose 144 mg/dL      BUN 8 mg/dL      Creatinine 0.49 mg/dL      Sodium 139 mmol/L      Potassium 3.4 mmol/L      Chloride 105 mmol/L      CO2 25.3 mmol/L      Calcium 9.3 mg/dL      eGFR Non African Amer >150 mL/min/1.73      BUN/Creatinine Ratio 16.3     Anion Gap 8.7 mmol/L     Narrative:      GFR Normal >60  Chronic Kidney Disease <60  Kidney Failure <15      Troponin [[id number]]  (Normal) Collected: 12/24/20 0128    Specimen: Blood Updated: 12/24/20 0213     Troponin T <0.010 ng/mL     Narrative:      Troponin T Reference Range:  <= 0.03 ng/mL-   Negative for AMI  >0.03 ng/mL-     Abnormal for myocardial necrosis.  Clinicians would have to utilize clinical acumen, EKG, Troponin and serial changes to determine if it is an Acute Myocardial Infarction or myocardial injury due to an underlying chronic condition.       Results may be falsely decreased if patient taking Biotin.      Hemoglobin A1c [[id number]]  (Abnormal) Collected: 12/24/20 0128    Specimen: Blood Updated: 12/24/20 0159     Hemoglobin A1C 6.90 %     Narrative:      Hemoglobin A1C Ranges:    Increased Risk for Diabetes  5.7% to 6.4%  Diabetes                     >= 6.5%  Diabetic Goal   "              < 7.0%    CBC Auto Differential [[id number]]  (Abnormal) Collected: 12/24/20 0128    Specimen: Blood Updated: 12/24/20 0141     WBC 10.70 10*3/mm3      RBC 3.17 10*6/mm3      Hemoglobin 9.6 g/dL      Hematocrit 29.1 %      MCV 91.8 fL      MCH 30.3 pg      MCHC 33.0 g/dL      RDW 11.6 %      RDW-SD 38.5 fl      MPV 9.4 fL      Platelets 357 10*3/mm3      Neutrophil % 72.0 %      Lymphocyte % 15.4 %      Monocyte % 9.5 %      Eosinophil % 1.3 %      Basophil % 0.3 %      Immature Grans % 1.5 %      Neutrophils, Absolute 7.70 10*3/mm3      Lymphocytes, Absolute 1.65 10*3/mm3      Monocytes, Absolute 1.02 10*3/mm3      Eosinophils, Absolute 0.14 10*3/mm3      Basophils, Absolute 0.03 10*3/mm3      Immature Grans, Absolute 0.16 10*3/mm3      nRBC 0.0 /100 WBC     POC Glucose Once [[id number]]  (Abnormal) Collected: 12/23/20 2058    Specimen: Blood Updated: 12/23/20 2100     Glucose 217 mg/dL     COVID PRE-OP / PRE-PROCEDURE SCREENING ORDER (NO ISOLATION) - Swab, Nasopharynx [[id number]]  (Normal) Collected: 12/23/20 1225    Specimen: Swab from Nasopharynx Updated: 12/23/20 1650    Narrative:      The following orders were created for panel order COVID PRE-OP / PRE-PROCEDURE SCREENING ORDER (NO ISOLATION) - Swab, Nasopharynx.  Procedure                               Abnormality         Status                     ---------                               -----------         ------                     COVID-19,APTIMA PANTHER,...[[id number]]  Normal              Final result                 Please view results for these tests on the individual orders.    COVID-19,APTIMA PANTHERRAFI IN-HOUSE, NP/OP SWAB IN UTM/VTM/SALINE TRANSPORT MEDIA,24 HR TAT - Swab, Nasopharynx [[id number]]  (Normal) Collected: 12/23/20 1225    Specimen: Swab from Nasopharynx Updated: 12/23/20 1650     COVID19 Not Detected    Narrative:      Fact sheet for providers: https://www.fda.gov/media/312639/download     Fact sheet for patients:  https://www.fda.gov/media/002455/download    Test performed by PCR.    Sedimentation Rate [[id number]]  (Abnormal) Collected: 12/23/20 1427    Specimen: Blood Updated: 12/23/20 1535     Sed Rate 43 mm/hr     C-reactive Protein [[id number]]  (Abnormal) Collected: 12/23/20 1427    Specimen: Blood Updated: 12/23/20 1517     C-Reactive Protein 1.67 mg/dL     BNP [[id number]]  (Abnormal) Collected: 12/23/20 1427    Specimen: Blood Updated: 12/23/20 1516     proBNP 1,418.0 pg/mL     Narrative:      Among patients with dyspnea, NT-proBNP is highly sensitive for the detection of acute congestive heart failure. In addition NT-proBNP of <300 pg/ml effectively rules out acute congestive heart failure with 99% negative predictive value.    Results may be falsely decreased if patient taking Biotin.      aPTT [[id number]]  (Normal) Collected: 12/23/20 1427    Specimen: Blood Updated: 12/23/20 1503     PTT 26.9 seconds     Protime-INR [[id number]]  (Abnormal) Collected: 12/23/20 1427    Specimen: Blood Updated: 12/23/20 1503     Protime 15.8 Seconds      INR 1.28    CBC & Differential [[id number]]  (Abnormal) Collected: 12/23/20 1427    Specimen: Blood Updated: 12/23/20 1501    Narrative:      The following orders were created for panel order CBC & Differential.  Procedure                               Abnormality         Status                     ---------                               -----------         ------                     CBC Auto Differential[[id number]]        Abnormal            Final result                 Please view results for these tests on the individual orders.    CBC Auto Differential [[id number]]  (Abnormal) Collected: 12/23/20 1427    Specimen: Blood Updated: 12/23/20 1501     WBC 9.78 10*3/mm3      RBC 3.26 10*6/mm3      Hemoglobin 9.8 g/dL      Hematocrit 29.9 %      MCV 91.7 fL      MCH 30.1 pg      MCHC 32.8 g/dL      RDW 11.5 %      RDW-SD 38.5 fl      MPV 9.5 fL      Platelets 357 10*3/mm3      Neutrophil %  77.7 %      Lymphocyte % 11.1 %      Monocyte % 8.8 %      Eosinophil % 0.7 %      Basophil % 0.2 %      Immature Grans % 1.5 %      Neutrophils, Absolute 7.59 10*3/mm3      Lymphocytes, Absolute 1.09 10*3/mm3      Monocytes, Absolute 0.86 10*3/mm3      Eosinophils, Absolute 0.07 10*3/mm3      Basophils, Absolute 0.02 10*3/mm3      Immature Grans, Absolute 0.15 10*3/mm3      nRBC 0.0 /100 WBC           Imaging Results (Last 24 Hours)     Procedure Component Value Units Date/Time    CT Angiogram Chest [[id number]] Collected: 12/24/20 0814     Updated: 12/24/20 0814    Narrative:      CT ANGIOGRAM CHEST WITH CONTRAST, PULMONARY EMBOLISM PROTOCOL     HISTORY: Recent right knee surgery. Shortness of breath. Rule out  pulmonary embolism.     TECHNIQUE: Spiral CT images were obtained from the lung apices to the  diaphragmatic domes following administration of intravenous contrast in  the angiographic phase. Coronal, sagittal and 3-D volume rendered  reformats were then obtained.     COMPARISON: CT chest from 12/17/2020.     FINDINGS: The pulmonary arteries are well opacified with intravenous  contrast.There is a small filling defect seen within left lingular  segmental pulmonary arterial branch, best demonstrated on coronal image  104 series 6. No convincing filling defects are identified. No pleural  or pericardial effusion is seen. No pathological mediastinal  lymphadenopathy. The patient is status post median sternotomy with CABG.  Small sliding hiatal hernia is seen. The central airways are patent  without endobronchial lesion. Parenchymal scarring within the lateral  aspect of the left lower lobe is unchanged. Again demonstrated is a  nodular density within the superior segment of the left lower lobe,  unchanged and measuring up to 1 cm. No pathological axillary  lymphadenopathy. The visualized upper abdomen is otherwise unremarkable.       Impression:      1. Small filling defect seen within the segmental left  pulmonary  arterial branch within the lingula consistent with pulmonary  thromboembolism.  2. Stable parenchymal scarring within the left lower lobe. Stable small  nodule within the superior segment of the left lower lobe for which  close interval follow-up with CT chest is recommended.     Radiation dose reduction techniques were utilized, including automated  exposure control and exposure modulation based on body size.                Patient Care Team:  Matt Holloway MD as PCP - General (Internal Medicine)    SUBJECTIVE  Status quo    PHYSICAL EXAM  Knee looks good     ASSESSMENT: Patient is a 70 y.o. male who is   s/p * No surgery found *   1.  Right total knee replacement December 14, 2020 correcting 11 degree varus deformity with 15 degree flexion contracture.  His knee itself is doing great postop though he is slow with physical therapy.  2.  Right leg DVT.  Doppler study results pending  3.  Pulmonary mass as noted on previous CT  4.  Diabetes mellitus  5.  Hypertension  6.  Coronary artery disease  7.  Immobilization syndrome.  As noted at home prior to his TKR his wife reports he was sitting in a chair for 2 months and almost never mobilized.  He had a small decubitus when he arrived at the hospital for his TKR    PLAN / DISPOSITION:  Agree with medicine that we can probably start him on an oral anticoagulant today.  Before doing so I would like to get results from his Doppler study which are pending.    We will go ahead and restart physical therapy for mobilization.    Vicente Vick Sr, MD  Orthopaedic Surgery  [facility]  [phone]

## 2020-12-24 NOTE — PLAN OF CARE
Goal Outcome Evaluation:  Plan of Care Reviewed With: patient  Progress: no change  Outcome Summary: patient has history of CAD, paroxysmal afib, CABG; only home med continued was hytrin, heart rate/BP elevated overnight; EKG (sinus rhythm with PACs) and troponin (<0.010), A ordered one dose of metoprolol (home med); CTA chest this morning; Lovenox 90mg Q12 with probable initiation of Xarelto or Eliquis on 12/24; patient is hopeful to be home for [place]

## 2020-12-25 LAB — QT INTERVAL: 391 MS

## 2021-03-04 DIAGNOSIS — Z23 IMMUNIZATION DUE: ICD-10-CM

## (undated) DEVICE — APPL DURAPREP IODOPHOR APL 26ML

## (undated) DEVICE — GLV SURG PREMIERPRO ORTHO LTX PF SZ8.5 BRN

## (undated) DEVICE — GAUZE,SPONGE,FLUFF,6"X6.75",STRL,10/TRAY: Brand: MEDLINE

## (undated) DEVICE — GLV SURG SIGNATURE ESSENTIAL PF LTX SZ8.5

## (undated) DEVICE — SYR LUERLOK 20CC BX/50

## (undated) DEVICE — DUAL CUT SAGITTAL BLADE

## (undated) DEVICE — TRAP FLD MINIVAC MEGADYNE 100ML

## (undated) DEVICE — BANDAGE,GAUZE,BULKEE II,4.5"X4.1YD,STRL: Brand: MEDLINE

## (undated) DEVICE — PK KN TOTL 40

## (undated) DEVICE — PENCL E/S ULTRAVAC TELESCP NOSE HOLSTR 10FT

## (undated) DEVICE — ZIP 24 SURGICAL SKIN CLOSURE DEVICE, PSA: Brand: ZIP 24 SURGICAL SKIN CLOSURE DEVICE

## (undated) DEVICE — ANTIBACTERIAL UNDYED BRAIDED (POLYGLACTIN 910), SYNTHETIC ABSORBABLE SUTURE: Brand: COATED VICRYL

## (undated) DEVICE — SOL NACL 0.9PCT 1000ML

## (undated) DEVICE — NEEDLE, QUINCKE, 20GX3.5": Brand: MEDLINE

## (undated) DEVICE — KT DRN EVAC WND PVC PCH WTROC RND 10F400

## (undated) DEVICE — DRSNG WND GZ CURAD OIL EMULSION 3X8IN LF STRL 1PK

## (undated) DEVICE — 2108 SERIES SAGITTAL BLADE, NO OFFSET  (12.4 X 1.19 X 82.1MM)